# Patient Record
Sex: FEMALE | Race: WHITE | NOT HISPANIC OR LATINO | Employment: FULL TIME | URBAN - METROPOLITAN AREA
[De-identification: names, ages, dates, MRNs, and addresses within clinical notes are randomized per-mention and may not be internally consistent; named-entity substitution may affect disease eponyms.]

---
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
## 2022-02-11 VITALS
WEIGHT: 111.99 LBS | SYSTOLIC BLOOD PRESSURE: 100 MMHG | HEART RATE: 73 BPM | HEIGHT: 64 IN | OXYGEN SATURATION: 97 % | DIASTOLIC BLOOD PRESSURE: 68 MMHG | TEMPERATURE: 98.3 F | BODY MASS INDEX: 19.12 KG/M2 | WEIGHT: 112.43 LBS | HEIGHT: 64 IN | BODY MASS INDEX: 19.2 KG/M2 | HEART RATE: 100 BPM | OXYGEN SATURATION: 99 % | DIASTOLIC BLOOD PRESSURE: 60 MMHG | SYSTOLIC BLOOD PRESSURE: 104 MMHG | TEMPERATURE: 97.8 F

## 2022-02-11 VITALS
HEIGHT: 64 IN | TEMPERATURE: 98.1 F | BODY MASS INDEX: 18.4 KG/M2 | HEART RATE: 70 BPM | WEIGHT: 107.81 LBS | DIASTOLIC BLOOD PRESSURE: 66 MMHG | OXYGEN SATURATION: 98 % | RESPIRATION RATE: 20 BRPM | SYSTOLIC BLOOD PRESSURE: 102 MMHG

## 2022-02-12 VITALS
SYSTOLIC BLOOD PRESSURE: 112 MMHG | HEIGHT: 64 IN | BODY MASS INDEX: 19.68 KG/M2 | DIASTOLIC BLOOD PRESSURE: 70 MMHG | HEART RATE: 64 BPM | WEIGHT: 115.3 LBS | TEMPERATURE: 98.7 F

## 2022-02-12 VITALS
HEART RATE: 87 BPM | OXYGEN SATURATION: 97 % | BODY MASS INDEX: 19.8 KG/M2 | SYSTOLIC BLOOD PRESSURE: 102 MMHG | WEIGHT: 115.96 LBS | DIASTOLIC BLOOD PRESSURE: 62 MMHG | HEIGHT: 64 IN | TEMPERATURE: 97.7 F

## 2022-02-12 VITALS
WEIGHT: 114.64 LBS | OXYGEN SATURATION: 97 % | DIASTOLIC BLOOD PRESSURE: 60 MMHG | SYSTOLIC BLOOD PRESSURE: 92 MMHG | HEART RATE: 67 BPM | HEIGHT: 64 IN | BODY MASS INDEX: 19.57 KG/M2 | TEMPERATURE: 97.3 F

## 2022-02-12 VITALS
BODY MASS INDEX: 19.04 KG/M2 | WEIGHT: 111.55 LBS | DIASTOLIC BLOOD PRESSURE: 60 MMHG | SYSTOLIC BLOOD PRESSURE: 118 MMHG | TEMPERATURE: 97.9 F | OXYGEN SATURATION: 99 % | HEART RATE: 84 BPM | HEIGHT: 64 IN

## 2022-02-12 VITALS
DIASTOLIC BLOOD PRESSURE: 60 MMHG | HEART RATE: 99 BPM | BODY MASS INDEX: 19.68 KG/M2 | WEIGHT: 115.3 LBS | OXYGEN SATURATION: 99 % | SYSTOLIC BLOOD PRESSURE: 112 MMHG | HEIGHT: 64 IN

## 2022-02-12 VITALS
BODY MASS INDEX: 18.93 KG/M2 | WEIGHT: 110.89 LBS | DIASTOLIC BLOOD PRESSURE: 60 MMHG | HEART RATE: 62 BPM | TEMPERATURE: 97.2 F | HEIGHT: 64 IN | SYSTOLIC BLOOD PRESSURE: 100 MMHG

## 2022-02-12 VITALS
BODY MASS INDEX: 20.1 KG/M2 | HEART RATE: 91 BPM | HEIGHT: 64 IN | OXYGEN SATURATION: 98 % | WEIGHT: 117.73 LBS | SYSTOLIC BLOOD PRESSURE: 118 MMHG | DIASTOLIC BLOOD PRESSURE: 60 MMHG

## 2022-02-15 NOTE — PROGRESS NOTES
Patient:   RICARDO VALADEZ            MRN: [id number]            FIN: [id number]               Age:   19 years     Sex:  Female     :  2000   Associated Diagnoses:   Anxiety   Author:   Lisa Jaffe MD      Chief Complaint   3/22/2019 11:44 AM CDT   Patient presents for anxiety consult.      History of Present Illness   Chief complaint and symptoms as noted above and confirmed with patient.      Here today with mom and dad for anxiety.    Parents note current concerns are she is hyper focused on the fact that she is attending school in [place] even though they live in [place] and feels like she is cheating the system.  Has started resisting going to school and is not gone 2-3 days out of this week.  Also family has noticed a short temper at home.  Is quick to anger.  Parents describe going from 0-60 over everything.  Mom notes feels she is walking on anxious.  Family has started with a new therapist named Olga Prince in [facility].  Phone number [phone].  Family notes my it does not have much insight into the degree of her response when she gets angry about something.  Family did restart the CBD oil but has not noticed that it is helping.  Birth control has significantly helped her acne especially on her back.  Mom wonders about gene site.  Dad inquires about medical marijuana.  Has had passive thoughts of suicide but no concrete plan.         Review of Systems   All other systems are negative      Health Status   Allergies:    Allergic Reactions (Selected)  Severity Not Documented  Methylphenidate 24 hour extended release (Tachycardia and heart palpitations)   Medications:  (Selected)   Prescriptions  Prescribed  Ortho Tri-Cyclen oral tablet: 1 tab(s), PO, Daily, # 28 tab(s), 11 Refill(s), Type: Maintenance, Pharmacy: [facility] [id number] [facility], 1 tab(s) Oral daily  Documented Medications  Documented  CBD oil: CBD oil, Supply, 0 Refill(s), Type: Maintenance  Fish Oil: Oral, 0 Refill(s), Type:  Maintenance  Vitamin C 1000 mg oral tablet: = 1 tab(s) ( 1,000 mg ), Oral, daily, 0 Refill(s), Type: Maintenance  Vitamin D3 1000 intl units oral capsule: = 1 cap(s) ( 1,000 International Unit ), Oral, daily, 0 Refill(s), Type: Maintenance  multivitamin with minerals (w/ Iron): See Instructions, 0 Refill(s), Type: Maintenance   Problem list:    All Problems  ADHD / 56179147 / Confirmed      Histories   Past Medical History:    Active  ADHD (64803371)   Family History:    Attention deficit disorder  Father (Manolo)  Anxiety  Grandmother (P)  Hypertension  Father (Manolo)  Grandfather (P)  Migraine  Great Aunt (M)  Heart attack  Grandfather (M)  Hyperlipidemia  Father (Manolo)  Sleep apnea  Father (Manolo)  Skin cancer  Grandmother (M)  Grandmother (P)     Procedure history:    Tear duct surgery in the month of 7/2003 at 20 Months.   Social History:        Alcohol Assessment            Never, Use of alcohol by peers: No.      Tobacco Assessment            Never, Use of tobacco by peers: No.      Substance Abuse Assessment            Never, Use of drugs by peers: No.      Home and Environment Assessment            Lives with Father, Mother, 1 older sister.  Living situation: Home/Independent.  Risks in environment:               Firearm in the household - unknown if locked..      Physical Examination   Vital Signs   3/22/2019 11:44 AM CDT Temperature Tympanic 97.8 DegF  LOW    Peripheral Pulse Rate 73 bpm    HR Method Electronic    Systolic Blood Pressure 100 mmHg    Diastolic Blood Pressure 60 mmHg    Mean Arterial Pressure 73 mmHg    BP Site Right arm    BP Method Manual    Oxygen Saturation 97 %      Measurements from flowsheet : Measurements   3/22/2019 11:44 AM CDT Height Measured - Metric 162.56 cm    Weight Measured - Metric 51 kg    BSA - Metric 1.52 m2    Body Mass Index - Metric 19.3 kg/m2    Body Mass Index Percentile 26.79      Vital signs as noted above   General:  Alert and oriented, No acute distress.     Psychiatric:  Cooperative, Appropriate mood & affect, Non-suicidal, Tearful when her mom is tearful discussing the challenges. .       Review / Management   Jonas 7: 13, somewhat difficult.  PHQ A: 4/27.  Question 1 is to, question 2 is blank, question 9 is 1.      Impression and Plan   Diagnosis     Anxiety (MNE16-JL F41.9).     Plan:  After discussion with family will start Zoloft 25 mg once daily.  Risks including black box warning reviewed.  Recommended regarding the house including taking out any firearms, locking all medications including over-the-counter, and is not leaving my unattended for any extended period of time especially in the first 2 weeks of medication initiation and if any med dosing changes occur.  Continue with therapy.  Next visit is next Wednesday.  Reviewed suicidal ideation is a medical emergency and they should go to ER if this occurs.  Return to clinic for recheck in 4-6 weeks.  Mom will call in 2 weeks with an update and may consider titration at that time.  Mom will let me know where they can have GeneSite testing and we will get it ordered. .    Orders     Orders (Selected)   Prescriptions  Prescribed  Zoloft 25 mg oral tablet: = 1 tab(s) ( 25 mg ), PO, Daily, # 30 tab(s), 1 Refill(s), Type: Maintenance, Pharmacy: [facility] [id number] [facility], 1 tab(s) Oral daily.

## 2022-02-15 NOTE — LETTER
(Inserted Image. Unable to display)       September 17, 2019      RICARDO VALADEZ   [address], [place] [id number]          Dear RICARDO,      Thank you for selecting [facility] for your healthcare needs.     Our records indicate you are due for the following services:     Follow-up Office Visit    To schedule an appointment or if you have further questions, please contact your primary clinic:   [facility]       [phone]   [facility]       [phone]              [facility]     [phone]    Powered by [employer]    Sincerely,    Lisa Jaffe MD

## 2022-02-15 NOTE — TELEPHONE ENCOUNTER
Entered by Vane Delong CMA on March 23, 2021 11:17:00 AM CDT  LOV: 09/04/20 Med Check  Reminder letter Mailed: 03/04/21  LRF: 02/24/21#30    Per protocol, sent in #14 tabs and sent message to appointment pool to set up appointment.  Vane Delong CMA      ------------------------------------------  From: [facility] [address] [facility]  To: Lisa Jaffe MD  Sent: March 21, 2021 7:32:06 AM CDT  Subject: Medication Management  Due: March 18, 2021 11:58:04 AM CDT     ** On Hold Pending Signature **     Dispensed Drug: sertraline (sertraline 25 mg oral tablet), 1 TAB(S) ORAL DAILY,INSTR:TAKE W/ 50MG TABS TO GET 75MG TOTAL.  Quantity: 30 tab(s)  Days Supply: 30  Refills: 0  Substitutions Allowed  Notes from Pharmacy:     ** On Hold Pending Signature **     Dispensed Drug: sertraline (sertraline 50 mg oral tablet), TAKE 1 TABLET BY MOUTH DAILY IN ADDITION TO 25MG TAB FOR 75MG DAILY DOSE. SEE DR FOR FUTURE REFILLS.  Quantity: 30 tab(s)  Days Supply: 30  Refills: 0  Substitutions Allowed  Notes from Pharmacy:  ------------------------------------------  ** Submitted: **  Order:sertraline (sertraline 25 mg oral tablet)  1 tab(s)  Oral  daily  INSTR:TAKE W/ 50MG TABS TO GET 75MG TOTAL.  Qty:  14 tab(s)        Duration:  14 day(s)        Refills:  0          Substitutions Allowed     Route To Pharmacy - [place] [id number] [facility]    Signed by Vane Delong CMA  3/23/2021 4:17:00 PM [facility]    ** Submitted: **  Complete:sertraline (sertraline 50 mg oral tablet)   Signed by Vane Delong CMA  3/23/2021 4:18:00 PM [facility]    ** Not Approved:  **  sertraline (SERTRALINE HCL 25 MG TABLET)  1 TAB(S) ORAL DAILY,INSTR:TAKE W/ 50MG TABS TO GET 75MG TOTAL.  Qty:  30 tab(s)        Days Supply:  30        Refills:  0          Substitutions Allowed     Route To Pharmacy - [facility] [address] [facility]   Signed by Vane Delong CMA---------------------  From: Vane Delong CMA   To: [facility] [address] [facility]    Sent: 3/23/2021 11:19:07 AM CDT  Subject: Medication Management      ** Submitted: **  Order:sertraline (sertraline 50 mg oral tablet)  1 tab(s)  Oral  daily  DOSE. SEE DR FOR FUTURE REFILLS.  Qty:  14 tab(s)        Duration:  14 day(s)        Refills:  0          Substitutions Allowed     Route To Pharmacy - [facility] [address] [facility]    Signed by Vane Delong CMA  3/23/2021 4:18:00 PM [facility]    ** Submitted: **  Complete:sertraline (sertraline 25 mg oral tablet)   Signed by Vane Delong CMA  3/23/2021 4:19:00 PM [facility]    ** Not Approved:  **  sertraline (SERTRALINE HCL 50 MG TABLET)  TAKE 1 TABLET BY MOUTH DAILY IN ADDITION TO 25MG TAB FOR 75MG DAILY DOSE. SEE DR FOR FUTURE REFILLS.  Qty:  30 tab(s)        Days Supply:  30        Refills:  0          Substitutions Allowed     Route To Pharmacy - [facility] [address] [facility]   Signed by Vane Delong CMA

## 2022-02-15 NOTE — NURSING NOTE
Seven day Holter Monitor Cardio key placed on patient per ARM for a diagnosis of tachycardia and  anxiety. Patient instructions given for all aspects of monitor operation and handling. Monitor kit sent with patient with contact information for [employer]. Patient leaves clinic ambulatory with mother. More than 15 minutes spent with patient for education and instruction.

## 2022-02-15 NOTE — NURSING NOTE
Comprehensive Intake Entered On:  9/1/2020 3:39 PM CDT    Performed On:  9/1/2020 3:38 PM CDT by Callum Begum               Summary   Chief Complaint :   Med check    Menstrual Status :   Menarcheal   Weight Measured - Metric :   50.6 kg(Converted to: 111 lb 9 oz, 111.554 lb)    Height Measured - Metric :   161.9 cm(Converted to: 5 ft 4 in, 5.31 ft, 1.62 m)    Body Mass Index - Metric :   19.3 kg/m2   BSA - Metric :   1.51 m2   Systolic Blood Pressure :   118 mmHg   Diastolic Blood Pressure :   60 mmHg   Mean Arterial Pressure :   79 mmHg   Peripheral Pulse Rate :   84 bpm   BP Site :   Right arm   BP Method :   Manual   HR Method :   Electronic   Temperature Tympanic :   97.9 DegF(Converted to: 36.6 DegC)    Oxygen Saturation :   99 %   Callum Begum - 9/1/2020 3:38 PM CDT   Health Status   Allergies Verified? :   Yes   Medication History Verified? :   Yes   Immunizations Current :   Yes   Medical History Verified? :   Yes   Pre-Visit Planning Status :   Completed   Well Child Visit? :   Yes   Callum Begum - 9/1/2020 3:38 PM CDT   Consents   Consent for Immunization Exchange :   Consent Granted   Consent for Immunizations to Providers :   Consent Granted   Callum Begum - 9/1/2020 3:38 PM CDT   Meds / Allergies   (As Of: 9/1/2020 3:39:24 PM CDT)   Allergies (Active)   No Known Medication Allergies  Estimated Onset Date:   Unspecified ; Created By:   Agnieszka Amaral CMA; Reaction Status:   Active ; Category:   Drug ; Substance:   No Known Medication Allergies ; Type:   Allergy ; Updated By:   Agnieszka Amaral CMA; Reviewed Date:   9/1/2020 3:38 PM CDT        Medication List   (As Of: 9/1/2020 3:39:24 PM CDT)   Prescription/Discharge Order    sertraline  :   sertraline ; Status:   Prescribed ; Ordered As Mnemonic:   sertraline 50 mg oral tablet ; Simple Display Line:   1 tab(s), Oral, daily, 30 tab(s), 0 Refill(s) ; Ordering Provider:   Lisa Jaffe MD; Catalog  Code:   sertraline ; Order Dt/Tm:   8/10/2020 9:19:16 AM CDT          ethinyl estradiol-norgestimate  :   ethinyl estradiol-norgestimate ; Status:   Prescribed ; Ordered As Mnemonic:   Ortho Tri-Cyclen oral tablet ; Simple Display Line:   1 tab(s), PO, Daily, 28 tab(s), 11 Refill(s) ; Ordering Provider:   Lisa Jaffe MD; Catalog Code:   ethinyl estradiol-norgestimate ; Order Dt/Tm:   1/31/2020 9:53:54 AM [facility]            Home Meds    ascorbic acid  :   ascorbic acid ; Status:   Documented ; Ordered As Mnemonic:   Vitamin C 1000 mg oral tablet ; Simple Display Line:   1,000 mg, 1 tab(s), Oral, daily, 0 Refill(s) ; Catalog Code:   ascorbic acid ; Order Dt/Tm:   3/22/2019 11:47:20 AM CDT          cholecalciferol  :   cholecalciferol ; Status:   Documented ; Ordered As Mnemonic:   Vitamin D3 1000 intl units oral capsule ; Simple Display Line:   1,000 International Unit, 1 cap(s), Oral, daily, 0 Refill(s) ; Catalog Code:   cholecalciferol ; Order Dt/Tm:   3/22/2019 11:46:51 AM CDT          multivitamin with minerals  :   multivitamin with minerals ; Status:   Documented ; Ordered As Mnemonic:   multivitamin with minerals (w/ Iron) ; Simple Display Line:   See Instructions, 0 Refill(s) ; Catalog Code:   multivitamin with minerals ; Order Dt/Tm:   3/22/2019 11:47:37 AM CDT          omega-3 polyunsaturated fatty acids  :   omega-3 polyunsaturated fatty acids ; Status:   Documented ; Ordered As Mnemonic:   Fish Oil ; Simple Display Line:   Oral, 0 Refill(s) ; Catalog Code:   omega-3 polyunsaturated fatty acids ; Order Dt/Tm:   3/22/2019 11:47:00 AM CDT            ID Risk Screen   Recent Travel History :   No recent travel   Family Member Travel History :   No recent travel   Other Exposure to Infectious Disease :   Unknown   Callum Begum - 9/1/2020 3:38 PM CDT

## 2022-02-15 NOTE — NURSING NOTE
Depression Screening Entered On:  2/4/2020 10:13 AM CST    Performed On:  1/31/2020 10:13 AM CST by Lavinia Reynoso               Depression Screening   Little Interest - Pleasure in Activities :   Not at all   Feeling Down, Depressed, Hopeless :   Not at all   Initial Depression Screen Score :   0    Trouble Falling or Staying Asleep :   Not at all   Feeling Tired or Little Energy :   Not at all   Poor Appetite or Overeating :   Not at all   Feeling Bad About Yourself :   Several days   Trouble Concentrating :   Several days   Moving or Speaking Slowly :   Not at all   Thoughts Better Off Dead or Hurting Self :   Not at all   Detailed Depression Screen Score :   2    Total Depression Screen Score :   2    Lavinia Reynoso - 2/4/2020 10:13 AM CST

## 2022-02-15 NOTE — TELEPHONE ENCOUNTER
---------------------  From: Linda Mccullough CMA   Sent: 5/8/2019 8:43:29 AM CDT  Subject: General Message-holter question     Phone Message    PCP:         Time of Call:  823       Person Calling:  mom  Phone number:  [phone]    Returned call at: 0830    Note:   Holter placed yesterday and forgot to ask if it ok to fly/go thru TSA with monitor on.    Discussed with Magda and she advised contacting the [employer] # given.  Shared with Lorie and she expressed understanding

## 2022-02-15 NOTE — TELEPHONE ENCOUNTER
---------------------  From: Aurora Kimble CMA   To: ARM Message Pool ([id number]_[place]);     Sent: 4/11/2019 2:09:50 PM CDT  Subject: General Message-Zoloft med     Phone Message:      PCP: ARM    Person Calling: Marcy  Phone: [phone]  Time: 11:45am    Reason for call: Mom called stating that pt was started on Zoloft 25mg QD at her anxiety appt on 3/22/19 and then mom called in on 4/5 and Dr. Jaffe increased Zoloft to 50mg QD but pt has only 2 tabs left and pharmacy will not let them fill Rx since the 2 tabs QD was never called in. Also pt's heart is racing again and mom wondering what testing pt needs to be set up for-does she see ARM first or need specialist. Please advise.       Note: I did send one month worth of medication to [facility] for Zoloft 25mg 2 tabs QD per ARM's note last week. Due for f/u in 2 weeks.    Transferred to:---------------------  From: Agnieszka Amaral CMA (ARM Message Pool ([id number]_[facility]))   To: Lisa Jaffe MD;     Sent: 4/11/2019 2:20:24 PM CDT  Subject: FW: General Message-Zoloft med---------------------  From: Lisa Jaffe MD   To: Phone [employer] ([id number]_[place] [facility] [place]);     Sent: 4/11/2019 4:15:18 PM CDT  Subject: RE: General Message-Zoloft med     We should have her set up for an event/Holter monitor- I can order and they can call for a nurse only appointment.  I would like to see her in clinic when this is completed- hopefully this could be done before their 4-6 week follow up.  In the meantime, mom can check heart rate when it occurs- if greater than 200 should be seen in ED.  I will send the 50mg Zoloft.  Thanks.called mother of pt at 1455 to inform of this. She will call and schedule nurse only appointment once they look at schedules

## 2022-02-15 NOTE — PROGRESS NOTES
Patient:   RICARDO VALADEZ            MRN: [id number]            FIN: [id number]               Age:   20 years     Sex:  Female     :  2000   Associated Diagnoses:   Anxiety; Immunization due; Eye irritation; Surveillance of contraceptive pill   Author:   Lisa Jaffe MD      Visit Information      Date of Service: 2020 09:05 am  Performing Location: [facility]  Encounter#: [id number]      Primary Care Provider (PCP):  Lisa Jaffe MD    NPI# [id number]      Referring Provider:  Lisa Jaffe MD    NPI# [id number]      Chief Complaint   2020 9:20 AM CST    med check. things going well. okay to be seen alone.      Interval History   Chief Complaint noted and reviewed with patient.     School/grades: Graduated high school. Taking a  coarse through [employer]. No thoughts of self harm. Will be traveling for a canoe trip in the summer. Thinks her Zoloft medication is going great.     Eyes- Patient states they are always irritated. Does use eye makeup. Slightly itchy. Has used topical in the past which helped.     Sleep:  Gets 7-8 hours of sleep each night.     Needs refills on oral contraceptives.  Acne has been much better on this.  Still flares right before her menses but overall family is very happy with how it is going.       Review of Systems   Constitutional:  Negative.    Eye:  Negative.    Ear/Nose/Mouth/Throat:  Negative.    Respiratory:  Negative.    Cardiovascular:  Negative.    Gastrointestinal:  Negative.    Genitourinary:  Negative.    Musculoskeletal:  Negative.    Integumentary:  Negative.       Health Status   Allergies:    Allergic Reactions (Selected)  No Known Medication Allergies   Medications:  (Selected)   Prescriptions  Prescribed  Ortho Tri-Cyclen oral tablet: 1 tab(s), PO, Daily, # 84 tab(s), 4 Refill(s), Type: Maintenance, Pharmacy: [facility] [address] [facility], 1 tab(s) Oral daily  Zoloft 50 mg oral tablet: = 1 tab(s) ( 50 mg ), PO, Daily, # 90 tab(s), 1 Refill(s),  Type: Maintenance, Pharmacy: [facility] [address] [facility], 1 tab(s) Oral daily  Documented Medications  Documented  Fish Oil: Oral, 0 Refill(s), Type: Maintenance  Vitamin C 1000 mg oral tablet: = 1 tab(s) ( 1,000 mg ), Oral, daily, 0 Refill(s), Type: Maintenance  Vitamin D3 1000 intl units oral capsule: = 1 cap(s) ( 1,000 International Unit ), Oral, daily, 0 Refill(s), Type: Maintenance  multivitamin with minerals (w/ Iron): See Instructions, 0 Refill(s), Type: Maintenance,    Medications          *denotes recorded medication          *Vitamin C 1000 mg oral tablet: 1,000 mg, 1 tab(s), Oral, daily, 0 Refill(s).          *Vitamin D3 1000 intl units oral capsule: 1,000 International Unit, 1 cap(s), Oral, daily, 0 Refill(s).          Ortho Tri-Cyclen oral tablet: 1 tab(s), PO, Daily, 84 tab(s), 4 Refill(s).          *multivitamin with minerals (w/ Iron): See Instructions, 0 Refill(s).          *Fish Oil: Oral, 0 Refill(s).          Zoloft 50 mg oral tablet: 50 mg, 1 tab(s), PO, Daily, 90 tab(s), 1 Refill(s).       Problem list:    All Problems  ADHD / SNOMED CT 17757390 / Confirmed      Histories   Past Medical History:    Active  ADHD (81418990)   Family History:    Attention deficit disorder  Father (Manolo)  Anxiety  Grandmother (P)  Hypertension  Father (Manolo)  Grandfather (P)  Migraine  Great Aunt (M)  Heart attack  Grandfather (M)  Hyperlipidemia  Father (Manolo)  Sleep apnea  Father (Manolo)  Skin cancer  Grandmother (M)  Grandmother (P)     Procedure history:    Tear duct surgery in the month of 7/2003 at 18 Months.   Social History:        Alcohol Assessment            Never, Use of alcohol by peers: No.      Tobacco Assessment            Never, Use of tobacco by peers: No.      Substance Abuse Assessment            Never, Use of drugs by peers: No.      Home and Environment Assessment            Lives with Father, Mother, 1 older sister.  Living situation: Home/Independent.  Risks in environment:               Firearm in  the household - unknown if locked..        Physical Examination   Vital Signs   1/31/2020 9:20 AM CST Peripheral Pulse Rate 99 bpm    Systolic Blood Pressure 112 mmHg    Diastolic Blood Pressure 60 mmHg    Mean Arterial Pressure 77 mmHg    Oxygen Saturation 99 %      Measurements from flowsheet : Measurements   1/31/2020 9:20 AM CST Height Measured - Metric 162.5 cm    Weight Measured - Metric 52.3 kg    BSA - Metric 1.54 m2    Body Mass Index - Metric 19.81 kg/m2    Body Mass Index Percentile 29.92      General:  :, bright affect.    Eye:  Pupils are equal, round and reactive to light, Extraocular movements are intact, Normal conjunctiva.    HENT:  Normocephalic, Tympanic membranes are clear, Oral mucosa is moist, No pharyngeal erythema, Upper eyelids with erythematous dry patch bilaterally.    Neck:  No lymphadenopathy.    Respiratory:  Lungs are clear to auscultation, Respirations are non-labored.    Cardiovascular:  Normal rate, Regular rhythm, No murmur.    Neurologic:  Alert, Oriented, Normal motor function, No focal deficits.       Review / Management   Results review   STAR 7: 2 total  PHQ-A: 2/27      Impression and Plan   Diagnosis     Anxiety (FUJ55-VX F41.9).     Eye irritation (RQA53-EU H57.89).     Surveillance of contraceptive pill (DRZ03-YM Z30.41).     Immunization due (UMR09-DG Z23).     Plan:  Menactra vaccine updated today.   Continue current Zoloft.   Topical antibiotic ointment to the eyelid issue.  Consider changing her makeup.  Can also do a small amount of hydrocortisone if needed- avoid getting into the eye.   Continue current oral contraceptives.   RTC for recheck 6 months, sooner if needed..    Orders     Orders (Selected)   Outpatient Orders  Completed  Menactra: 0.5 mL, IM, once  Prescriptions  Prescribed  Ortho Tri-Cyclen oral tablet: 1 tab(s), PO, Daily, # 28 tab(s), 11 Refill(s), Type: Maintenance, Pharmacy: [place] [address] [facility], 1 tab(s) Oral daily  Zoloft 50 mg oral tablet: = 1  tab(s) ( 50 mg ), PO, Daily, # 90 tab(s), 1 Refill(s), Type: Maintenance, Pharmacy: [facility] [address] [facility], 1 tab(s) Oral daily  erythromycin 0.5% ophthalmic ointment: See Instructions, Instructions: 1 application twice daily to affected area on eyelid x 5 days, # 3.5 gm, 0 Refill(s), Type: Acute, Pharmacy: [place] [address] [facility], 1 application twice daily to affected area on eyelid x 5 days.        Professional Services   I, Kellie Sam LPN, acted solely as a scribe for, and in the presence of Dr. Lisa Jaffe who performed the services.

## 2022-02-15 NOTE — RESULTS
(Inserted Image. Unable to display)          (Inserted Image. Unable to display)     [address], [place] [address]  Phone [phone]  Fax [phone]  HOLTER MONITOR REPORT    RICARDO VALADEZOscar : 2000  Date of Exam:  2019  MRN: [id number]   Ordering HCP:  Lisa Jaffe MD       INDICATION:  Paroxysmal tachycardia.     FINDINGS: The Holter monitor recording period was 7 days with analysis time of 6 days 18 hours and 38 minutes.     The rhythm is sinus.  Average rate of 74.  Minimum rate of 46.  Maximum rate of 138.  No pauses or heart block.    No SVT or atrial fibrillation.                       No ventricular ectopics.        No patient symptom events.         IMPRESSION:  Normal 7 day recording; however, no patient symptom events during the recording.         Abelardo Muniz MD, FACP  Interpreting HCP                    DOREEN/kyler   D:  2019                                                                          T:  2019    HOLTER MONITOR REPORT

## 2022-02-15 NOTE — NURSING NOTE
Comprehensive Intake Entered On:  5/16/2019 8:29 AM CDT    Performed On:  5/16/2019 8:26 AM CDT by Agnieszka Amaral CMA               Summary   Chief Complaint :   Patient presents for anxiety medication check.   Menstrual Status :   Menarcheal   Weight Measured - Metric :   50.8 kg(Converted to: 112 lb 0 oz, 111.995 lb)    Height Measured - Metric :   162.56 cm(Converted to: 5 ft 4 in, 5.33 ft, 1.63 m)    Body Mass Index - Metric :   19.22 kg/m2   BSA - Metric :   1.51 m2   Systolic Blood Pressure :   104 mmHg   Diastolic Blood Pressure :   68 mmHg   Mean Arterial Pressure :   80 mmHg   Peripheral Pulse Rate :   100 bpm (HI)    BP Site :   Right arm   BP Method :   Manual   HR Method :   Electronic   Temperature Tympanic :   98.3 DegF(Converted to: 36.8 DegC)    Oxygen Saturation :   99 %   Agnieszka Amaral CMA - 5/16/2019 8:26 AM CDT   Health Status   Allergies Verified? :   Yes   Medication History Verified? :   Yes   Immunizations Current :   Yes   Pre-Visit Planning Status :   Completed   Tobacco Use? :   Never smoker   Agnieszka Amaral CMA - 5/16/2019 8:26 AM CDT   Consents   Consent for Immunization Exchange :   Consent Granted   Consent for Immunizations to Providers :   Consent Granted   Agnieszka Amaral CMA - 5/16/2019 8:26 AM CDT   Problems   (As Of: 5/16/2019 8:29:58 AM CDT)   Problems(Active)    ADHD (SNOMED CT  :77420596 )  Name of Problem:   ADHD ; Recorder:   Lisa Jaffe MD; Confirmation:   Confirmed ; Classification:   Medical ; Code:   14562824 ; Contributor System:   [facility] [place] ; Last Updated:   3/24/2016 9:08 AM CDT ; Life Cycle Status:   Active ; Responsible Provider:   Lisa Jaffe MD; Vocabulary:   SNOMED CT          Meds / Allergies   (As Of: 5/16/2019 8:29:58 AM CDT)   Allergies (Active)   No Known Medication Allergies  Estimated Onset Date:   Unspecified ; Created By:   David Murray CMA; Reaction Status:   Active ; Category:   Drug ; Substance:   No Known Medication Allergies ;  Type:   Allergy ; Updated By:   David Murray CMA; Reviewed Date:   5/16/2019 8:27 AM CDT        Medication List   (As Of: 5/16/2019 8:29:58 AM CDT)   Prescription/Discharge Order    ethinyl estradiol-norgestimate  :   ethinyl estradiol-norgestimate ; Status:   Prescribed ; Ordered As Mnemonic:   Ortho Tri-Cyclen oral tablet ; Simple Display Line:   1 tab(s), PO, Daily, 28 tab(s), 11 Refill(s) ; Ordering Provider:   Lsia Jaffe MD; Catalog Code:   ethinyl estradiol-norgestimate ; Order Dt/Tm:   1/28/2019 3:17:23 PM          sertraline  :   sertraline ; Status:   Prescribed ; Ordered As Mnemonic:   Zoloft 50 mg oral tablet ; Simple Display Line:   50 mg, 1 tab(s), PO, Daily, 7 tab(s), 0 Refill(s) ; Ordering Provider:   Lisa Jaffe MD; Catalog Code:   sertraline ; Order Dt/Tm:   5/7/2019 11:22:10 AM            Home Meds    ascorbic acid  :   ascorbic acid ; Status:   Documented ; Ordered As Mnemonic:   Vitamin C 1000 mg oral tablet ; Simple Display Line:   1,000 mg, 1 tab(s), Oral, daily, 0 Refill(s) ; Catalog Code:   ascorbic acid ; Order Dt/Tm:   3/22/2019 11:47:20 AM          cholecalciferol  :   cholecalciferol ; Status:   Documented ; Ordered As Mnemonic:   Vitamin D3 1000 intl units oral capsule ; Simple Display Line:   1,000 International Unit, 1 cap(s), Oral, daily, 0 Refill(s) ; Catalog Code:   cholecalciferol ; Order Dt/Tm:   3/22/2019 11:46:51 AM          Miscellaneous Prescription  :   Miscellaneous Prescription ; Status:   Processing ; Ordered As Mnemonic:   CBD oil ; Action Display:   Complete ; Catalog Code:   Miscellaneous Prescription ; Order Dt/Tm:   5/16/2019 8:28:11 AM          multivitamin with minerals  :   multivitamin with minerals ; Status:   Documented ; Ordered As Mnemonic:   multivitamin with minerals (w/ Iron) ; Simple Display Line:   See Instructions, 0 Refill(s) ; Catalog Code:   multivitamin with minerals ; Order Dt/Tm:   3/22/2019 11:47:37 AM          omega-3 polyunsaturated  fatty acids  :   omega-3 polyunsaturated fatty acids ; Status:   Documented ; Ordered As Mnemonic:   Fish Oil ; Simple Display Line:   Oral, 0 Refill(s) ; Catalog Code:   omega-3 polyunsaturated fatty acids ; Order Dt/Tm:   3/22/2019 11:47:00 AM

## 2022-02-15 NOTE — NURSING NOTE
Comprehensive Intake Entered On:  10/17/2019 2:04 PM CDT    Performed On:  10/17/2019 1:59 PM CDT by Agnieszka Amaral CMA               Summary   Chief Complaint :   Patient presents for anxiety follow-up.   Menstrual Status :   Menarcheal   Weight Measured - Metric :   52 kg(Converted to: 114 lb 10 oz, 114.640 lb)    Height Measured - Metric :   162.56 cm(Converted to: 5 ft 4 in, 5.33 ft, 1.63 m)    Body Mass Index - Metric :   19.68 kg/m2   BSA - Metric :   1.53 m2   Systolic Blood Pressure :   92 mmHg   Diastolic Blood Pressure :   60 mmHg   Mean Arterial Pressure :   71 mmHg   Peripheral Pulse Rate :   67 bpm   BP Site :   Right arm   BP Method :   Manual   HR Method :   Electronic   Temperature Tympanic :   97.3 DegF(Converted to: 36.3 DegC)  (LOW)    Oxygen Saturation :   97 %   Agnieszka Amaral CMA - 10/17/2019 1:59 PM CDT   Health Status   Allergies Verified? :   Yes   Medication History Verified? :   Yes   Immunizations Current :   Yes   Pre-Visit Planning Status :   Completed   Tobacco Use? :   Never smoker   Agnieszka Amaral CMA - 10/17/2019 1:59 PM CDT   Consents   Consent for Immunization Exchange :   Consent Granted   Consent for Immunizations to Providers :   Consent Granted   Agnieszka Amaral CMA - 10/17/2019 1:59 PM CDT   Meds / Allergies   (As Of: 10/17/2019 2:04:37 PM CDT)   Allergies (Active)   No Known Medication Allergies  Estimated Onset Date:   Unspecified ; Created By:   Agnieszka Amaral CMA; Reaction Status:   Active ; Category:   Drug ; Substance:   No Known Medication Allergies ; Type:   Allergy ; Updated By:   Agnieszka Amaral CMA; Reviewed Date:   10/17/2019 2:00 PM CDT        Medication List   (As Of: 10/17/2019 2:04:37 PM CDT)   Prescription/Discharge Order    ethinyl estradiol-norgestimate  :   ethinyl estradiol-norgestimate ; Status:   Prescribed ; Ordered As Mnemonic:   Ortho Tri-Cyclen oral tablet ; Simple Display Line:   1 tab(s), PO, Daily, 84 tab(s), 4 Refill(s) ; Ordering  Provider:   Lisa Jaffe MD; Catalog Code:   ethinyl estradiol-norgestimate ; Order Dt/Tm:   5/16/2019 8:45:39 AM CDT          sertraline  :   sertraline ; Status:   Prescribed ; Ordered As Mnemonic:   Zoloft 50 mg oral tablet ; Simple Display Line:   50 mg, 1 tab(s), PO, Daily, 90 tab(s), 1 Refill(s) ; Ordering Provider:   Lisa Jaffe MD; Catalog Code:   sertraline ; Order Dt/Tm:   5/16/2019 8:44:52 AM CDT            Home Meds    ascorbic acid  :   ascorbic acid ; Status:   Documented ; Ordered As Mnemonic:   Vitamin C 1000 mg oral tablet ; Simple Display Line:   1,000 mg, 1 tab(s), Oral, daily, 0 Refill(s) ; Catalog Code:   ascorbic acid ; Order Dt/Tm:   3/22/2019 11:47:20 AM CDT          cholecalciferol  :   cholecalciferol ; Status:   Documented ; Ordered As Mnemonic:   Vitamin D3 1000 intl units oral capsule ; Simple Display Line:   1,000 International Unit, 1 cap(s), Oral, daily, 0 Refill(s) ; Catalog Code:   cholecalciferol ; Order Dt/Tm:   3/22/2019 11:46:51 AM CDT          multivitamin with minerals  :   multivitamin with minerals ; Status:   Documented ; Ordered As Mnemonic:   multivitamin with minerals (w/ Iron) ; Simple Display Line:   See Instructions, 0 Refill(s) ; Catalog Code:   multivitamin with minerals ; Order Dt/Tm:   3/22/2019 11:47:37 AM CDT          omega-3 polyunsaturated fatty acids  :   omega-3 polyunsaturated fatty acids ; Status:   Documented ; Ordered As Mnemonic:   Fish Oil ; Simple Display Line:   Oral, 0 Refill(s) ; Catalog Code:   omega-3 polyunsaturated fatty acids ; Order Dt/Tm:   3/22/2019 11:47:00 AM CDT

## 2022-02-15 NOTE — PROGRESS NOTES
Patient:   RICARDO VALADEZ            MRN: [id number]            FIN: [id number]               Age:   19 years     Sex:  Female     :  2000   Associated Diagnoses:   Anxiety   Author:   Lisa Jaffe MD      Visit Information      Date of Service: 2019 08:14 am  Performing Location: [facility]  Encounter#: [id number]      Primary Care Provider (PCP):  Lisa Jaffe MD    NPI# [id number]      Referring Provider:  Lisa Jaffe MD    NPI# [id number]      Chief Complaint   2019 8:26 AM CDT    Patient presents for anxiety medication check.      History of Present Illness   Chief complaint and symptoms as noted above and confirmed with patient.  Here today with dad.     Feeling better on Zoloft - not as irritable. Dad thinks irritability/outbursts have improved by about half. Deleted snap chat- doing better without this vicente.     Sleep going well- feels hot sometimes at night which wakes her up.    Heart monitor went well dad said no episodes recorded.    BC going well- missed one pill ended up having a 12 day period. Needs 3mos supply of oral contraceptive.      Review of Systems   All other systems are negative      Health Status   Allergies:    Allergic Reactions (Selected)  No Known Medication Allergies   Medications:  (Selected)   Prescriptions  Prescribed  Ortho Tri-Cyclen oral tablet: 1 tab(s), PO, Daily, # 28 tab(s), 11 Refill(s), Type: Maintenance, Pharmacy: [facility], 1 tab(s) Oral daily  Zoloft 50 mg oral tablet: = 1 tab(s) ( 50 mg ), PO, Daily, # 7 tab(s), 0 Refill(s), Type: Maintenance, Pharmacy: [facility], 1 tab(s) Oral daily  Documented Medications  Documented  Fish Oil: Oral, 0 Refill(s), Type: Maintenance  Vitamin C 1000 mg oral tablet: = 1 tab(s) ( 1,000 mg ), Oral, daily, 0 Refill(s), Type: Maintenance  Vitamin D3 1000 intl units oral capsule: = 1 cap(s) ( 1,000 International Unit ), Oral, daily, 0 Refill(s), Type: Maintenance  multivitamin with minerals  (w/ Iron): See Instructions, 0 Refill(s), Type: Maintenance,    Medications          *denotes recorded medication          *Vitamin C 1000 mg oral tablet: 1,000 mg, 1 tab(s), Oral, daily, 0 Refill(s).          *Vitamin D3 1000 intl units oral capsule: 1,000 International Unit, 1 cap(s), Oral, daily, 0 Refill(s).          Ortho Tri-Cyclen oral tablet: 1 tab(s), PO, Daily, 28 tab(s), 11 Refill(s).          *multivitamin with minerals (w/ Iron): See Instructions, 0 Refill(s).          *Fish Oil: Oral, 0 Refill(s).          Zoloft 50 mg oral tablet: 50 mg, 1 tab(s), PO, Daily, 7 tab(s), 0 Refill(s).     Problem list:    All Problems  ADHD / SNOMED CT 32163787 / Confirmed      Histories   Past Medical History:    Active  ADHD (99323297)   Family History:    Attention deficit disorder  Father (Manolo)  Anxiety  Grandmother (P)  Hypertension  Father (Manolo)  Grandfather (P)  Migraine  Great Aunt (M)  Heart attack  Grandfather (M)  Hyperlipidemia  Father (Manolo)  Sleep apnea  Father (Manolo)  Skin cancer  Grandmother (M)  Grandmother (P)     Procedure history:    Tear duct surgery in the month of 7/2003 at 20 Months.   Social History:        Alcohol Assessment            Never, Use of alcohol by peers: No.      Tobacco Assessment            Never, Use of tobacco by peers: No.      Substance Abuse Assessment            Never, Use of drugs by peers: No.      Home and Environment Assessment            Lives with Father, Mother, 1 older sister.  Living situation: Home/Independent.  Risks in environment:               Firearm in the household - unknown if locked..      Physical Examination   Vital Signs   5/16/2019 8:26 AM CDT Temperature Tympanic 98.3 DegF    Peripheral Pulse Rate 100 bpm  HI    HR Method Electronic    Systolic Blood Pressure 104 mmHg    Diastolic Blood Pressure 68 mmHg    Mean Arterial Pressure 80 mmHg    BP Site Right arm    BP Method Manual    Oxygen Saturation 99 %      Measurements from flowsheet : Measurements    5/16/2019 8:26 AM CDT Height Measured - Metric 162.56 cm    Weight Measured - Metric 50.8 kg    BSA - Metric 1.51 m2    Body Mass Index - Metric 19.22 kg/m2    Body Mass Index Percentile 25.25      Vital signs as noted above   General:  Alert and oriented, Great eye contact, smiles.    Respiratory:  Lungs clear to auscultation bilaterally.  Equal air entry.  Symmetrical chest expansion.  No wheezing.  .    Cardiovascular:  S1 and S2 with regular rate and rhythm.  No murmurs.  Pulses 2+ in all four extremities.  Brisk capillary refill.  .    Psychiatric:  Cooperative, Appropriate mood & affect.       Review / Management   PHQ-A: 2/27 (previously 4/27)  STAR-7: total 3 (previously 13)      Impression and Plan   Diagnosis     Anxiety (AFH60-QA F41.9).     Plan:  Continue Zoloft 50mg.  Continue oral contraceptive sent 3 month supply.  RTC in 3-4mos f/u Anxiety sooner if needed. .    Orders     Orders (Selected)   Prescriptions  Prescribed  Ortho Tri-Cyclen oral tablet: 1 tab(s), PO, Daily, # 84 tab(s), 4 Refill(s), Type: Maintenance, Pharmacy: [id number], 1 tab(s) Oral daily  Zoloft 50 mg oral tablet: = 1 tab(s) ( 50 mg ), PO, Daily, # 90 tab(s), 1 Refill(s), Type: Maintenance, Pharmacy: [facility] (?????) [id number], 1 tab(s) Oral daily.        Professional Services   IAgnieszka CMA ([facility]) acted solely as a scribe for and in the presents of Dr Lisa Jaffe who performed the services.

## 2022-02-15 NOTE — TELEPHONE ENCOUNTER
---------------------  From: Bailey Magallon RN   To: INR Pool ( [id number]_[facility]);     Sent: 7/12/2019 1:35:28 PM CDT  Subject: 7 day holter     VM received from pt adrien Melo. Stating they would like to re-do the 7 day heart monitor as pt has had episodes again and first monitor did not record any info.    Pt wants to see the CardioKey and LifeWatch monitor at appointment time to decide which device to use.    Called pt mother and she was transferred to appt line to schedule placement.Scheduled for 7/17/19CardioKey placed per pt request

## 2022-02-15 NOTE — PROGRESS NOTES
Patient:   ALEXA VALADEZ            MRN: [id number]            FIN: [id number]               Age:   18 years     Sex:  Female     :  2000   Associated Diagnoses:   Acne vulgaris; ADHD; Well child examination   Author:   Lisa Jaffe MD      Chief Complaint   2018 3:11 PM CST    Pt here today for 18 yr well child exam.      Well Child History   Here today with mom.    Does cross country skiing.  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  Asks lots of questions.    School is going fine.  Grades are fine.  Still a struggle.    Was quite tired last night.  Sometimes crabby.  Is a little better.  Has QNRT.  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  Has seen her for about 12 sessions.  Alexa thought was helpful.  She did seem less angry and mean.  Has also talked about going to [facility] for another diagnostic testing to see before she is 20 if there is another diagnosis.  Had been taking out on mom.  IS still taking her fluoxetine.  Still has a lot of self deprecating attitudes.  Friends are also doing this.  Mom doesn't think we have quite figured out everything.    Sleep:  fine- goes to bed 10pm and up at 6am.    Appetite:  is going well.      With mom out of the room denies tobacco, alcohol, and drug use.  Not sexually active.       Review of Systems   Cardiovascular:  Heart racing happened on her ski trip.  Had to slow down and this helped.  Did not feel dizzy.  Hasn't happened for a long.  .       Health Status   Allergies:    Allergic Reactions (Selected)  Severity Not Documented  Methylphenidate 24 hour extended release (Tachycardia and heart palpitations)   Medications:  (Selected)   Prescriptions  Prescribed  FLUoxetine 10 mg oral capsule: 1 cap(s) ( 10 mg ), po, daily, # 30 cap(s), 0 Refill(s), Type: Maintenance, Pharmacy: [facility] [id number]  Documented Medications  Documented  Iron-150 oral tablet: 1 tab(s), po, daily, 0 Refill(s), Type: Maintenance  Omega-3 oral capsule: 0 Refill(s), Type:  Maintenance   Problem list:    All Problems  ADHD / [id number] / Confirmed      Histories   Past Medical History:    Active  ADHD (22831772)   Family History:    Hypertension  Father (Manolo)  Grandfather (P)  Heart attack  Grandfather (M)  Hyperlipidemia  Father (Manolo)  Sleep apnea  Father (Manolo)  Skin cancer  Grandmother (M)  Grandmother (P)     Procedure history:    Tear duct surgery in the month of 7/2003 at 19 Months.   Social History:        Alcohol Assessment: Denies Alcohol Use            Never, Use of alcohol by peers: No.      Tobacco Assessment: Denies Tobacco Use            Never, Use of tobacco by peers: No.      Substance Abuse Assessment: Denies Substance Abuse            Never, Use of drugs by peers: No.      Home and Environment Assessment            Lives with Father, Mother, 1 older sister.  Living situation: Home/Independent.  Risks in environment:               Firearm in the household - unknown if locked..        Physical Examination   Vital Signs   2/20/2018 3:11 PM CST Peripheral Pulse Rate 91 bpm  HI    HR Method Electronic    Systolic Blood Pressure 118 mmHg    Diastolic Blood Pressure 60 mmHg    Mean Arterial Pressure 79 mmHg    BP Site Right arm    BP Method Manual    Oxygen Saturation 98 %      Measurements from flowsheet : Measurements   2/20/2018 3:11 PM CST Height Measured - Metric 162.5 cm    Weight Measured - Metric 53.4 kg    BSA - Metric 1.55 m2    Body Mass Index - Metric 20.22 kg/m2    Body Mass Index Percentile 46.35      General:  Alert and oriented.    Eye:  Pupils are equal, round and reactive to light, Extraocular movements are intact, Undilated funduscopic exam:  Vessels smooth, disc margins not visualized. .    HENT:  Tympanic membranes are clear, Oral mucosa is moist, No pharyngeal erythema.    Neck:  No lymphadenopathy, No thyromegaly.    Respiratory:  Lungs clear to auscultation bilaterally.  Equal air entry.  Symmetrical chest expansion.  No wheezing.  .     Cardiovascular:  S1 and S2 with regular rate and rhythm.  No murmurs.  Pulses 2+ in all four extremities.  Brisk capillary refill.  .    Gastrointestinal:  Positive bowel sounds in all four quadrants.  Abdomen is soft, non-distended, non-tender.  No hepatosplenomegaly.  .    Genitourinary:  Normal female genitalia.  Koko stage 5 and 5.  No lesions.  .    Musculoskeletal:  No deformity, Normal gait.    Integumentary:  No rash, Moderate open and closed comedones over chest and back..    Neurologic:  No focal deficits.    Psychiatric:  Appropriate mood & affect.       Review / Management   Growth charts reviewed.      Impression and Plan   Diagnosis     Acne vulgaris (LJY30-LG L70.0).     Well child examination (NUU72-MY Z00.129).     Acne vulgaris (PXB05-YI L70.0).     ADHD (STK98-HN F90.0).     Well child examination (YLK16-XZ Z00.129).     ADHD (JOU34-OW F90.0).     Plan:  Anticipatory guidance reviewed.   Plan for Menactra next year.   Will do trial of Differin for acne.   Continue current fluoxetine.   Referral to Wally for further delineation of diagnoses.  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    RTC 6 months for recheck anxiety. .    Orders     Orders (Selected)   Outpatient Orders  Ordered  Referral (Request): 02/20/18 15:51:00 CST, Referred to: Other, Additional instructions: Wally RE:  history of anxiety and attention challenges.  Evaluate and treat, ADHD  Prescriptions  Prescribed  Differin 0.1% topical cream: 1 vicente, TOP, Once a day (at bedtime), # 45 g, 3 Refill(s), Type: Maintenance, Pharmacy: [facility] [id number], 1 vicente top hs  FLUoxetine 10 mg oral capsule: 1 cap(s) ( 10 mg ), po, daily, # 30 cap(s), 6 Refill(s), Type: Maintenance, Pharmacy: [facility] [id number], 1 cap(s) po daily.

## 2022-02-15 NOTE — TELEPHONE ENCOUNTER
---------------------  From: Linda Mccullough CMA (Phone Messages Pool ([id number]_[facility]))   To: Advanced Practice Provider Pool ([id number]_[facility]);     Sent: 4/28/2021 12:41:29 PM CDT  Subject: General Message-refill of eye ointment     Phone Message    PCP:   [facility]      Time of Call:  1225       Person Calling:  self  Phone number:  [phone]  Note:   pt having irritation again on her eyelid, asking if she can a refill of eye ointment ARM rx'd 1-2 yrs ago.    per 1/31/20 note   Topical antibiotic ointment to the eyelid issue.  Consider changing her makeup.  Can also do a small amount of hydrocortisone if needed- avoid getting into the eye.     erythromycin 0.5% ophthalmic ointment: See Instructions, Instructions: 1 application twice daily to affected area on eyelid x 5 days, # 3.5 gm, 0 Refill(s), Type: Acute, Pharmacy: [facility] [id number], 1 application twice daily to affected area on eyelid x 5 days.       [facility] [place] pharm---------------------  From: [place] CARRIZALES, Kareem SILVA (Advanced Practice Provider Pool ([id number]_[facility]))   To: Phone Messages Pool ([id number]_[place] [facility] [place]);     Sent: 4/28/2021 12:44:52 PM CDT  Subject: RE: General Message-refill of eye ointment     Rx is renewedpt contacted at 2125

## 2022-02-15 NOTE — TELEPHONE ENCOUNTER
---------------------  From: Elza Medina   Sent: 1/28/2019 4:05:21 PM CST  Subject: lab results     Informed mom of negative HCG result by phone call- informed her that Rx's were sent- verbalized understanding, no further questions or concerns.

## 2022-02-15 NOTE — NURSING NOTE
Generalized Anxiety Disorder Screening Entered On:  3/26/2019 9:30 AM CDT    Performed On:  3/22/2019 9:28 AM CDT by Iliana Frank MA               Generalized Anxiety Disorder Screening   STAR Nervous, Anxious On Edge :   More than half the days   STAR Control Worrying B :   Nearly every day   STAR Worrying Too Much :   Nearly every day   STAR Restless :   Several days   STAR Easily Annoyed/Irritable :   More than half the days   STAR Afraid :   Not at all   STAR Trouble Relaxing :   More than half the days   STAR Total Screening Score :   13    STAR Difficulty with Work, Home, Others :   Somewhat difficult   Zac RAMIREZ, Iliana - 3/26/2019 9:28 AM CDT

## 2022-02-15 NOTE — LETTER
(Inserted Image. Unable to display)   August 01, 2019      ALEXA VALADEZ   [address] [place], [place] [id number]        Dear ALEXA,      Thank you for selecting [facility] for your healthcare needs.      Alexa's Holter Monitor did not reveal any abnormalities.  Please let me know if you have any questions.  We can also discuss further at her next clinic visit.            Please contact me or my assistant at [phone] if you have any questions or concerns.     Sincerely,        Lisa Jaffe MD

## 2022-02-15 NOTE — TELEPHONE ENCOUNTER
Entered by Iliana Frank MA on February 24, 2021 10:23:32 AM CST  ---------------------  From: Iliana Frank MA   To: [facility] [id number] [facility]    Sent: 2/24/2021 10:23:32 AM CST  Subject: Medication Management     ** Submitted: **  Order:sertraline (sertraline 50 mg oral tablet)  1 tab(s)  Oral  daily  take w/ 25mg tab to get 75mg total  Qty:  30 tab(s)        Refills:  0          Substitutions Allowed     Route To Pharmacy - [facility] [id number] [facility]    Signed by Iliana Frank MA  2/24/2021 4:22:00 PM UT    ** Submitted: **  Complete:sertraline (sertraline 25 mg oral tablet)   Signed by Iliana Frank MA  2/24/2021 4:23:00 PM UT    ** Not Approved:  **  sertraline (SERTRALINE HCL 50 MG TABLET)  TAKE 1 TABLET BY MOUTH ONCE DAILY WITH THE 25MG TAB FOR A TOTAL DAILY DOSE OF 75MG.  Qty:  90 tab(s)        Days Supply:  90        Refills:  1          Substitutions Allowed     Route To Pharmacy - [facility] [id number] [facility]   Signed by Iliana Frank MA            ** Submitted: **  Order:sertraline (sertraline 25 mg oral tablet)  1 tab(s)  Oral  daily  take w/ 50mg tabs to get 75mg total.  Qty:  30 tab(s)        Refills:  0          Substitutions Allowed     Route To Pharmacy - [facility] [id number] [facility]    Signed by Iliana Frank MA  2/24/2021 4:21:00 PM UT    ** Submitted: **  Complete:sertraline (sertraline 25 mg oral tablet)   Signed by Iliana Frank MA  2/24/2021 4:22:00 PM UT    ** Submitted: **  Complete:sertraline (sertraline 50 mg oral tablet)   Signed by Iliana Frank MA  2/24/2021 4:22:00 PM UT    ** Not Approved:  **  sertraline (SERTRALINE HCL 25 MG TABLET)  TAKE 1 TABLET BY MOUTH DAILY WITH THE 50MG TAB FOR A TOTAL DAILY DOSE OF 75MG.  Qty:  90 tab(s)        Days Supply:  90        Refills:  1          Substitutions Allowed     Route To Pharmacy - [facility] [address] [facility]   Signed by Iliana Frank MA            ------------------------------------------  From: [id number]  To: Lisa Jaffe MD  Sent:  February 24, 2021 12:25:23 AM CST  Subject: Medication Management  Due: February 19, 2021 9:56:59 PM CST     ** On Hold Pending Signature **     Dispensed Drug: sertraline (sertraline 50 mg oral tablet), TAKE 1 TABLET BY MOUTH ONCE DAILY WITH THE 25MG TAB FOR A TOTAL DAILY DOSE OF 75MG.  Quantity: 90 tab(s)  Days Supply: 90  Refills: 1  Substitutions Allowed  Notes from Pharmacy:     ** On Hold Pending Signature **     Dispensed Drug: sertraline (sertraline 25 mg oral tablet), TAKE 1 TABLET BY MOUTH DAILY WITH THE 50MG TAB FOR A TOTAL DAILY DOSE OF 75MG.  Quantity: 90 tab(s)  Days Supply: 90  Refills: 1  Substitutions Allowed  Notes from Pharmacy:  ------------------------------------------LV:  9/1/2020 w/ ARM for med check; RTC due soon, message sent to pharmacy

## 2022-02-15 NOTE — PROGRESS NOTES
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        History of Present Illness   Chief complaint and symptoms as noted above and confirmed with patient.  Here today with dad.  Has had left ear pain since yesterday.  Has been swimming.  NO drainage from katharine ear.  Mom used peroxide .  Put some medication drops in there.    Medicines are going well.  Moods are okay.  Fluotine once per day.        Review of Systems   All other systems are negative      Health Status   Allergies:    Allergic Reactions (Selected)  Severity Not Documented  Methylphenidate 24 hour extended release (Tachycardia and heart palpitations)   Medications:  (Selected)   Prescriptions  Prescribed  FLUoxetine 10 mg oral capsule: 1 cap(s) ( 10 mg ), PO, Daily, # 30 cap(s), 7 Refill(s), Type: Maintenance, Pharmacy: [facility], Do not fill until mom calls, 1 cap(s) po daily  Documented Medications  Documented  Iron-150 oral tablet: 1 tab(s), po, daily, 0 Refill(s), Type: Maintenance  Omega-3 oral capsule: 0 Refill(s), Type: Maintenance   Problem list:    All Problems  ADHD / [id number] / Confirmed      Histories   Past Medical History:    Active  ADHD ([id number])   Family History:    Hypertension  Father (Manolo)  Grandfather (P)  Heart attack  Grandfather (M)  Hyperlipidemia  Father (Manolo)  Sleep apnea  Father (Manolo)  Skin cancer  Grandmother (M)  Grandmother (P)     Procedure history:    Tear duct surgery in the month of 2003 at 19 Months.   Social History:        Alcohol Assessment: Denies Alcohol Use            Never, Use of alcohol by peers: No.      Tobacco Assessment: Denies Tobacco Use            Never, Use of tobacco by peers:  No.      Substance Abuse Assessment: Denies Substance Abuse            Never, Use of drugs by peers: No.      Home and Environment Assessment            Lives with Father, Mother, 1 older sister.  Living situation: Home/Independent.  Risks in environment:               Firearm in the household - unknown if locked..        Physical Examination   Vital Signs   7/5/2017 11:01 AM CDT Temperature Tympanic 97.2 DegF  LOW    Peripheral Pulse Rate 62 bpm    Pulse Site Radial artery    HR Method Manual    Systolic Blood Pressure 100 mmHg    Diastolic Blood Pressure 60 mmHg    Mean Arterial Pressure 73 mmHg    BP Site Right arm    BP Method Manual      Measurements from flowsheet : Measurements   7/5/2017 11:01 AM CDT Height Measured - Metric 162 cm    Weight Measured - Metric 50.3 kg    BSA - Metric 1.5 m2    Body Mass Index - Metric 19.17 kg/m2    Body Mass Index Percentile 36.23      Vital signs as noted above   General:  Alert and oriented.    Eye:  Pupils are equal, round and reactive to light, Extraocular movements are intact.    HENT:  Oral mucosa is moist, No pharyngeal erythema, Left outter canal is erythematous and edematous.  White discharge present on surface of TM. .    Neck:  Few anterior nodes..    Respiratory:  Lungs clear to auscultation bilaterally.  Equal air entry.  Symmetrical chest expansion.  No wheezing.  .    Cardiovascular:  S1 and S2 with regular rate and rhythm.  No murmurs.  Pulses 2+ in all four extremities.  Brisk capillary refill.  .       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     Otitis externa (KJG56-YB H60.332).     Plan:  Continue current fluoxetine 10 mg daily.    Discussed considering trial off when Alexa and parents are ready.   Topical hydrocortisone/neomycin/polymyxin B drops to left ear x 7 days.   If not improving might consider adding oral antibiotic  as could not get a great view of TM today due to debris in the canal. .    Orders     Orders (Selected)   Prescriptions  Prescribed  FLUoxetine 10 mg oral capsule: 1 cap(s) ( 10 mg ), PO, Daily, # 30 cap(s), 7 Refill(s), Type: Maintenance, Pharmacy: [email or web address], Do not fill until mom calls, 1 cap(s) po daily  hydrocortisone/neomycin/polymyxin B 1%-0.35%-10,000 units/mL otic solution: 2 drop(s), left ear, QID, x 7 day(s), # 10 mL, 0 Refill(s), Type: Acute, Pharmacy: [email or web address], 2 drop(s) left ear qid,x7 day(s).

## 2022-02-15 NOTE — NURSING NOTE
Generalized Anxiety Disorder Screening Entered On:  10/22/2019 7:50 PM CDT    Performed On:  10/17/2019 7:49 PM CDT by Pa April               Generalized Anxiety Disorder Screening   STAR Nervous, Anxious On Edge :   Several days   STAR Control Worrying B :   Not at all   STAR Worrying Too Much :   Several days   STAR Restless :   Not at all   STAR Easily Annoyed/Irritable :   Several days   STAR Afraid :   Not at all   STAR Trouble Relaxing :   Several days   STAR Total Screening Score :   4    STAR Difficulty with Work, Home, Others :   Not difficult at all   Pa , April - 10/22/2019 7:49 PM CDT

## 2022-02-15 NOTE — NURSING NOTE
Generalized Anxiety Disorder Screening Entered On:  4/26/2021 11:44 AM CDT    Performed On:  4/22/2021 11:43 AM CDT by Lavinia Reynoso               STAR-7   STAR Nervous, Anxious On Edge :   Several days   STAR Control Worrying B :   Several days   STAR Worrying Too Much :   Several days   STAR Trouble Relaxing :   Not at all   STAR Restless :   Not at all   STAR Easily Annoyed/Irritable :   Several days   STAR Afraid :   Not at all   STAR Total Screening Score :   4    STAR Difficulty with Work, Home, Others :   Not difficult at all   Lavinia Reynoso - 4/26/2021 11:43 AM CDT

## 2022-02-15 NOTE — TELEPHONE ENCOUNTER
---------------------  From: Naun ALVAREZ, Bailey PRESLEY   Sent: 7/17/2019 2:44:03 PM CDT  Subject: 7 day cardiokey placement     Placed CardioKey holter @ 1439 per ARM for dx of Tachycardia. Gave patient and father written & verbal instructions and sent patient home with all supplies. Patient indicated she understood. End of service date is 7/24/19.Printed Holter Monitor Report and routed to [facility] for interpretation.

## 2022-02-15 NOTE — NURSING NOTE
Comprehensive Intake Entered On:  4/22/2021 8:43 AM CDT    Performed On:  4/22/2021 8:42 AM CDT by Callum Begum               Summary   Chief Complaint :   Med check.     Menstrual Status :   Menarcheal   Weight Measured - Metric :   52.6 kg(Converted to: 115 lb 15 oz, 115.963 lb)    Height Measured - Metric :   162.7 cm(Converted to: 5 ft 4 in, 5.34 ft, 1.63 m)    Body Mass Index - Metric :   19.87 kg/m2   BSA - Metric :   1.54 m2   Systolic Blood Pressure :   102 mmHg   Diastolic Blood Pressure :   62 mmHg   Mean Arterial Pressure :   75 mmHg   Peripheral Pulse Rate :   87 bpm   BP Site :   Right arm   BP Method :   Manual   HR Method :   Electronic   Temperature Tympanic :   97.7 DegF(Converted to: 36.5 DegC)  (LOW)    Oxygen Saturation :   97 %   Callum Begum - 4/22/2021 8:42 AM CDT   Meds / Allergies   (As Of: 4/22/2021 8:43:32 AM CDT)   Allergies (Active)   No Known Medication Allergies  Estimated Onset Date:   Unspecified ; Created By:   Agnieszka Amaral CMA; Reaction Status:   Active ; Category:   Drug ; Substance:   No Known Medication Allergies ; Type:   Allergy ; Updated By:   Agnieszka Amaral CMA; Reviewed Date:   4/22/2021 8:42 AM CDT        Medication List   (As Of: 4/22/2021 8:43:32 AM CDT)   Prescription/Discharge Order    sertraline  :   sertraline ; Status:   Prescribed ; Ordered As Mnemonic:   sertraline 50 mg oral tablet ; Simple Display Line:   1 tab(s), Oral, daily, for 14 day(s), DOSE. SEE DR FOR FUTURE REFILLS., 14 tab(s), 0 Refill(s) ; Ordering Provider:   Lisa Jaffe MD; Catalog Code:   sertraline ; Order Dt/Tm:   3/23/2021 11:18:30 AM CDT          ethinyl estradiol-norgestimate  :   ethinyl estradiol-norgestimate ; Status:   Prescribed ; Ordered As Mnemonic:   Ortho Tri-Cyclen oral tablet ; Simple Display Line:   1 tab(s), PO, Daily, 84 tab(s), 4 Refill(s) ; Ordering Provider:   Lisa Jaffe MD; Catalog Code:   ethinyl estradiol-norgestimate ; Order Dt/Tm:    9/1/2020 3:58:18 PM CDT            Home Meds    ascorbic acid  :   ascorbic acid ; Status:   Documented ; Ordered As Mnemonic:   Vitamin C 1000 mg oral tablet ; Simple Display Line:   1,000 mg, 1 tab(s), Oral, daily, 0 Refill(s) ; Catalog Code:   ascorbic acid ; Order Dt/Tm:   3/22/2019 11:47:20 AM CDT          cholecalciferol  :   cholecalciferol ; Status:   Documented ; Ordered As Mnemonic:   Vitamin D3 1000 intl units oral capsule ; Simple Display Line:   1,000 International Unit, 1 cap(s), Oral, daily, 0 Refill(s) ; Catalog Code:   cholecalciferol ; Order Dt/Tm:   3/22/2019 11:46:51 AM CDT          multivitamin with minerals  :   multivitamin with minerals ; Status:   Documented ; Ordered As Mnemonic:   multivitamin with minerals (w/ Iron) ; Simple Display Line:   See Instructions, 0 Refill(s) ; Catalog Code:   multivitamin with minerals ; Order Dt/Tm:   3/22/2019 11:47:37 AM CDT          omega-3 polyunsaturated fatty acids  :   omega-3 polyunsaturated fatty acids ; Status:   Documented ; Ordered As Mnemonic:   Fish Oil ; Simple Display Line:   Oral, 0 Refill(s) ; Catalog Code:   omega-3 polyunsaturated fatty acids ; Order Dt/Tm:   3/22/2019 11:47:00 AM CDT            ID Risk Screen   Recent Travel History :   No recent travel   Family Member Travel History :   No recent travel   Other Exposure to Infectious Disease :   Unknown   COVID-19 Testing Status :   No COVID-19 test performed   Callum Begum - 4/22/2021 8:42 AM CDT   Social History   Social History   (As Of: 4/22/2021 8:43:32 AM CDT)   Alcohol:        Never, Use of alcohol by peers: No.   (Last Updated: 1/9/2014 1:48:11 PM CST by Sirisah Brar MA)          Tobacco:        Never, Use of tobacco by peers: No.   (Last Updated: 1/9/2014 1:48:00 PM CST by Sirisha Brar MA)   Never (less than 100 in lifetime)   (Last Updated: 4/22/2021 8:43:25 AM CDT by Callum Begum)          Electronic Cigarette/Vaping:        Electronic Cigarette  Use: Never.   (Last Updated: 4/22/2021 8:43:30 AM CDT by Callum Begum)          Substance Abuse:        Never, Use of drugs by peers: No.   (Last Updated: 1/9/2014 1:48:24 PM CST by Sirisha Brar MA)          Home/Environment:        Lives with Father, Mother, 1 older sister.  Living situation: Home/Independent.  Risks in environment: Firearm in the household - unknown if locked..   (Last Updated: 2/1/2016 5:16:37 PM CST by Faith Jackson)

## 2022-02-15 NOTE — PROGRESS NOTES
Patient:   RICARDO VALADEZ            MRN: [id number]            FIN: [id number]               Age:   19 years     Sex:  Female     :  2000   Associated Diagnoses:   Anxiety, generalized; Tachycardia   Author:   Lisa Jaffe MD      Chief Complaint   10/17/2019 1:59 PM CDT   Patient presents for anxiety follow-up.      History of Present Illness   Chief complaint and symptoms as noted above and confirmed with patient.  Here today for follow up on medications.  Is a senior in high school.  Will graduate early and take some college classes.  Will still walk with her class this summer.  Anxiety medication is helping a ton.  The oral contraceptives are helping immensely with her mood swings.  Is getting along well with her family right now.  Is still working a lot after school- [employer].  Is planning an expedition.  Is involved in her [employer]- teens encountering [employer]; has an outreach event this weekend.        Review of Systems   Constitutional:  Negative.    Eye:  Negative.    Ear/Nose/Mouth/Throat:  Negative.    Respiratory:  Negative.    Cardiovascular:  Still has tachycardia occasionally.  Has an aunt with hyperthyroidism- wonders if she should be checked.  Denies other hyperthyroidism symptoms.    Gastrointestinal:  Negative.    Psychiatric:  Negative except as documented in history of present illness.       Health Status   Allergies:    Allergic Reactions (Selected)  No Known Medication Allergies   Medications:  (Selected)   Prescriptions  Prescribed  Ortho Tri-Cyclen oral tablet: 1 tab(s), PO, Daily, # 84 tab(s), 4 Refill(s), Type: Maintenance, Pharmacy: [facility], 1 tab(s) Oral daily  Zoloft 50 mg oral tablet: = 1 tab(s) ( 50 mg ), PO, Daily, # 90 tab(s), 1 Refill(s), Type: Maintenance, Pharmacy: [facility], 1 tab(s) Oral daily  Documented Medications  Documented  Fish Oil: Oral, 0 Refill(s), Type: Maintenance  Vitamin C 1000 mg oral tablet: = 1 tab(s) ( 1,000 mg ),  Oral, daily, 0 Refill(s), Type: Maintenance  Vitamin D3 1000 intl units oral capsule: = 1 cap(s) ( 1,000 International Unit ), Oral, daily, 0 Refill(s), Type: Maintenance  multivitamin with minerals (w/ Iron): See Instructions, 0 Refill(s), Type: Maintenance   Problem list:    All Problems  ADHD / [id number] / Confirmed      Histories   Past Medical History:    Active  ADHD ([id number])   Family History:    Attention deficit disorder  Father (Manolo)  Anxiety  Grandmother (P)  Hypertension  Father (Manolo)  Grandfather (P)  Migraine  Great Aunt (M)  Heart attack  Grandfather (M)  Hyperlipidemia  Father (Manolo)  Sleep apnea  Father (Manolo)  Skin cancer  Grandmother (M)  Grandmother (P)     Procedure history:    Tear duct surgery in the month of 7/2003 at 20 Months.   Social History:        Alcohol Assessment            Never, Use of alcohol by peers: No.      Tobacco Assessment            Never, Use of tobacco by peers: No.      Substance Abuse Assessment            Never, Use of drugs by peers: No.      Home and Environment Assessment            Lives with Father, Mother, 1 older sister.  Living situation: Home/Independent.  Risks in environment:               Firearm in the household - unknown if locked..        Physical Examination   Vital Signs   10/17/2019 1:59 PM CDT Temperature Tympanic 97.3 DegF  LOW    Peripheral Pulse Rate 67 bpm    HR Method Electronic    Systolic Blood Pressure 92 mmHg    Diastolic Blood Pressure 60 mmHg    Mean Arterial Pressure 71 mmHg    BP Site Right arm    BP Method Manual    Oxygen Saturation 97 %      Measurements from flowsheet : Measurements   10/17/2019 1:59 PM CDT Height Measured - Metric 162.56 cm    Weight Measured - Metric 52 kg    BSA - Metric 1.53 m2    Body Mass Index - Metric 19.68 kg/m2    Body Mass Index Percentile 29.59      Vital signs as noted above   General:  Alert and oriented, No acute distress, Bright affect, confident, good eye contact, smiles.    Respiratory:  Lungs  clear to auscultation bilaterally.  Equal air entry.  Symmetrical chest expansion.  No wheezing.  .    Cardiovascular:  S1 and S2 with regular rate and rhythm.  No murmurs.  Pulses 2+ in all four extremities.  Brisk capillary refill.  .       Review / Management   PHQ-A: 2/27  STAR 7: 4      Impression and Plan   Diagnosis     Anxiety, generalized (ZNV15-HN F41.1).     Tachycardia (ECK05-YL R00.0).     Plan:  Continue current Zoloft and oral contraceptives as prescribed.    Will check screening thyroid and hemoglobin labs today related to the ongoing tachycardia issues.   Flu vaccine given today.   RTC 6 months, sooner if needed. .    Orders     Orders (Selected)   Prescriptions  Prescribed  Zoloft 50 mg oral tablet: = 1 tab(s) ( 50 mg ), PO, Daily, # 90 tab(s), 1 Refill(s), Type: Maintenance, Pharmacy: [facility] [id number] [facility], 1 tab(s) Oral daily.

## 2022-02-15 NOTE — PROGRESS NOTES
Patient:   RICARDO VALADEZ            MRN: [id number]            FIN: [id number]               Age:   19 years     Sex:  Female     :  2000   Associated Diagnoses:   Well child examination; Acne vulgaris; Acute actinic otitis externa, bilateral; ADHD; Anxiety; Contraception management   Author:   Lisa Jaffe MD      Chief Complaint   2019 2:44 PM CST    Pt here for 19yr well child      Well Child History   Parent concerns: Here today with mom for 19-year well-child.    1.  Ongoing concerns about anxiety and ADHD symptoms.  Does now have a 504 at school and will get extra time for ECT.  Has been using CBD oil but unsure if this is been helping.  Is at a point where they could stop this and switch to something different.  Mom notes she still crabby in the morning.  Does seem calmer after taking the CBD oil.  Paternal grandmother with anxiety, never medicated to mom's knowledge.  Dad with possible ADHD although was never diagnosed.  She notes multiple times on her form that she feels quite stressed out.  When I asked her about this specifically she refers to the ACT coming up.    2.  Has acne.  Did try topical things but mom is wondering if an oral contraceptive might help manage.  Is worse on her chest and back.  No concerns about her periods other than significant premenstrual symptoms.  They are regular.  Denies sexual activity.  Last menstrual period: January 10.    3. left ear pain.  Family just got back from a week long trip to [place].  Was swimming frequently.     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  Does not have any homework at night.  Is thinking about doing a gap year prior to college.  Is interested in early childhood education.  Does a great job with kids.  Has shadowed at a .  Has not made any friends at her new school but does occasionally see friends from her old school.    No concerns about dietary intake.  No body image concerns.    Sleep: Only  difficulties getting her awake in the morning.  Does use melatonin to fall asleep.           Review of Systems   Constitutional:  Negative.    Eye:  Negative.    Ear/Nose/Mouth/Throat:  Negative.    Respiratory:  Negative.    Cardiovascular:  Some heart palpitations with physical activity.  Mom previously thought this was related to the Concerta. .    Gastrointestinal:  Negative.    Genitourinary:  Negative.    Musculoskeletal:  Negative.    Integumentary:  Negative.       Health Status   Allergies:    Allergic Reactions (Selected)  Severity Not Documented  Methylphenidate 24 hour extended release (Tachycardia and heart palpitations)   Medications:  (Selected)   Documented Medications  Documented  CBD oil: CBD oil, Supply, 0 Refill(s), Type: Maintenance   Problem list:    All Problems  ADHD / [id number] / Confirmed      Histories   Past Medical History:    Active  ADHD ([id number])   Family History:    Hypertension  Father (Manolo)  Grandfather (P)  Heart attack  Grandfather (M)  Hyperlipidemia  Father (Manolo)  Sleep apnea  Father (Manolo)  Skin cancer  Grandmother (M)  Grandmother (P)     Procedure history:    Tear duct surgery in the month of 7/2003 at 20 Months.   Social History:        Alcohol Assessment            Never, Use of alcohol by peers: No.      Tobacco Assessment            Never, Use of tobacco by peers: No.      Substance Abuse Assessment            Never, Use of drugs by peers: No.      Home and Environment Assessment            Lives with Father, Mother, 1 older sister.  Living situation: Home/Independent.  Risks in environment:               Firearm in the household - unknown if locked..      Physical Examination   Vital Signs   1/28/2019 2:44 PM CST Temperature Tympanic 98.7 DegF    Peripheral Pulse Rate 64 bpm    Pulse Site Radial artery    HR Method Manual    Systolic Blood Pressure 112 mmHg    Diastolic Blood Pressure 70 mmHg    Mean Arterial Pressure 84 mmHg    BP Site Right arm    BP Method Manual       Measurements from flowsheet : Measurements   1/28/2019 2:44 PM CST Height Measured - Metric 162.5 cm    Weight Measured - Metric 52.3 kg    BSA - Metric 1.54 m2    Body Mass Index - Metric 19.81 kg/m2    Body Mass Index Percentile 34.94      General:  Alert and oriented, No acute distress.    Eye:  Pupils are equal, round and reactive to light, Extraocular movements are intact, Undilated funduscopic exam:  Vessels smooth, disc margins not visualized. .    HENT:  Tympanic membranes are clear, Oral mucosa is moist, No pharyngeal erythema.    Neck:  No lymphadenopathy, No thyromegaly.    Respiratory:  Lungs clear to auscultation bilaterally.  Equal air entry.  Symmetrical chest expansion.  No wheezing.  .    Cardiovascular:  S1 and S2 with regular rate and rhythm.  No murmurs.  Pulses 2+ in all four extremities.  Brisk capillary refill.  .    Gastrointestinal:  Positive bowel sounds in all four quadrants.  Abdomen is soft, non-distended, non-tender.  No hepatosplenomegaly.  .    Genitourinary:  Normal female genitalia.  Koko stage 5 and 5.  .    Musculoskeletal:  Normal gait.    Integumentary:  No rash.    Neurologic:  No focal deficits, Normal deep tendon reflexes.    Psychiatric:  Appropriate mood & affect.       Review / Management   Growth charts reviewed with family.      Impression and Plan   Diagnosis     Well child examination (LNO72-VR Z00.129).     Acne vulgaris (MFU74-CH L70.0).     Acute actinic otitis externa, bilateral (KOW44-LT H60.513).     ADHD (TNX57-TM F90.9).     Anxiety (BYN83-BC F41.9).     Contraception management (WLZ07-RY Z30.9).     Plan:  Anticipatory guidance reviewed:  Open communication with parents, daily breakfast, physical activity, avoidance drugs/alcohol/tobacco, car safety.   Discussed a multitude of options regarding the anxiety, ADHD and mood issue.  Discussed if we are using medication specifically geared towards anxiety and ADHD I would recommend discontinuing the CBD oil  as it is unknown if there would be any interactions.  Briefly discussed an alternative to fluoxetine she previously was on including Zoloft or citalopram.  We will do a trial of hormone oral contraceptives for the acne, premenstrual symptoms.  If not doing well after 2 months on this from an acne standpoint will consider a 3-month course of doxycycline.  Ciprodex topically for the otitis externa.  Immunizations are up-to-date including flu vaccine.  Vision screen today acceptable with her glasses on.  Return to clinic for 20-year well-child.  Return to clinic in 2 months for recheck on ADHD, anxiety and oral contraceptives.  .    Orders     Orders (Selected)   Prescriptions  Prescribed  Ciprodex 0.3%-0.1% otic suspension: 4 drop(s), Ear-Both, bid, x 7 day(s), # 7.5 mL, 0 Refill(s), Type: Acute, Pharmacy: [email or web address], 4 drop(s) Ear-Both bid,x7 day(s)  Ortho Tri-Cyclen oral tablet: 1 tab(s), PO, Daily, # 28 tab(s), 11 Refill(s), Type: Maintenance, Pharmacy: [email or web address], 1 tab(s) Oral daily.

## 2022-02-15 NOTE — LETTER
(Inserted Image. Unable to display)         March 04, 2021        RICARDO VALADEZ   [address] [place], [place] [id number]        Dear RICARDO,    Thank you for selecting [facility] for your healthcare needs.    Our records indicate you are due for the following services:     Medication Check      (FYI   Regarding office visits: In some instances, a video visit or telephone visit may be offered as an option.)    To schedule an appointment or if you have further questions, please contact your clinic at [phone].    Powered by [employer]    Sincerely,    Lisa Jaffe MD

## 2022-02-15 NOTE — NURSING NOTE
Depression Screening Entered On:  5/22/2019 12:03 PM CDT    Performed On:  5/16/2019 12:02 PM CDT by Josette Lehman CMA               Depression Screening   Little Interest - Pleasure in Activities :   Not at all   Feeling Down, Depressed, Hopeless :   Not at all   Initial Depression Screen Score :   0    Trouble Falling or Staying Asleep :   Several days   Feeling Tired or Little Energy :   Not at all   Poor Appetite or Overeating :   Not at all   Feeling Bad About Yourself :   Several days   Trouble Concentrating :   Not at all   Moving or Speaking Slowly :   Not at all   Thoughts Better Off Dead or Hurting Self :   Not at all   Detailed Depression Screen Score :   2    Total Depression Screen Score :   2    Josette Lehman CMA - 5/22/2019 12:02 PM CDT

## 2022-02-15 NOTE — TELEPHONE ENCOUNTER
Entered by Jasmin Navarro CMA on August 10, 2020 9:19:11 AM CDT  Date of last office visit and reason:  1/31/2020 Med check w/ ARM      Date of last Med Check / Px:   _  Date of last labs pertaining to med:  _     RTC order in chart:  Was due back last month. One month protocol today    For Protocol refill, has patient been contacted:  Letter/message        ------------------------------------------  From: [facility] [id number] [facility]  To: Lisa Jaffe MD  Sent: August 8, 2020 12:30:53 PM CDT  Subject: Medication Management  Due: July 28, 2020 10:29:52 PM CDT     ** On Hold Pending Signature **     Dispensed Drug: sertraline (sertraline 50 mg oral tablet), TAKE 1 TABLET BY MOUTH EVERY DAY  Quantity: 90 tab(s)  Days Supply: 90  Refills: 1  Substitutions Allowed  Notes from Pharmacy:  ---------------------------------------------------------------  From: Jasmin Navarro CMA   To: [facility] [id number] [facility]    Sent: 8/10/2020 9:19:39 AM CDT  Subject: Medication Management     ** Submitted: **  Order:sertraline (sertraline 50 mg oral tablet)  1 tab(s)  Oral  daily  Qty:  30 tab(s)        Refills:  0          Substitutions Allowed     Route To Pharmacy - [place] [id number] [facility]    Signed by Jasmin Navarro CMA  8/10/2020 2:19:00 PM [facility]    ** Submitted: **  Complete:sertraline (Zoloft 50 mg oral tablet)   Signed by Jasmin Navarro CMA  8/10/2020 2:19:00 PM [facility]    ** Not Approved:  **  sertraline (SERTRALINE HCL 50 MG TABLET)  TAKE 1 TABLET BY MOUTH EVERY DAY  Qty:  90 tab(s)        Days Supply:  90        Refills:  1          Substitutions Allowed     Route To Pharmacy - [place] [id number] [facility]   Signed by Jasmin Navarro CMA

## 2022-02-15 NOTE — TELEPHONE ENCOUNTER
---------------------  From: Lisa Jaffe MD   To: Phone Messages Pool ([id number] - [place]);     Sent: 10/22/2019 8:42:28 PM CDT  Subject: lab results     Please call family- Alexa's labs were all within normal limits- no evidence her thyroid should be a cause of the tachycardia.  Thanks.         Results:  Date Result Name Value Ref Range   10/17/2019 2:33 PM T4 Free 0.9 ng/dL (0.8 - 1.4)   10/17/2019 2:33 PM TSH 1.25 mIU/L    10/17/2019 2:33 PM Hgb 15.2 gm/dL (11.5 - 15.3)LM for adrien Melo to call back.Manolo, patients father called back and was informed of results.

## 2022-02-15 NOTE — TELEPHONE ENCOUNTER
---------------------  From: Vane Delong CMA   To: Appointment Pool ([id number]);     Sent: 3/23/2021 11:21:10 AM CDT  Subject: appt needed for med refill     Pt needs OV for med check for further refills with ARM or MJP for anxiety/depression. 14 day supply sent to pharmacy per protocol.  Vane Delong CMA.CALLED [facility] FOR PATIENT TO CALL BACK AND SCHEDULEscheduled

## 2022-02-15 NOTE — LETTER
(Inserted Image. Unable to display)     August 03, 2020      RICARDO VALADEZ   [address] [place], [place] [id number]          Dear RICARDO,      Thank you for selecting [facility] for your healthcare needs.      Our records indicate you are due for the following services:     Follow-up office visit / Medication Check.      To schedule an appointment or if you have further questions, please contact your primary clinic:   [facility]       [phone]   [facility]       [phone]              [facility]     [phone]      Powered by [employer]    Sincerely,    Lisa Jaffe MD

## 2022-02-15 NOTE — PROGRESS NOTES
Patient:   RICARDO VALADEZ            MRN: [id number]            FIN: [id number]               Age:   17 years     Sex:  Female     :  2000   Associated Diagnoses:   Acne vulgaris; Encounter for well child check without abnormal findings   Author:   Lisa Jaffe MD      Chief Complaint   2017 10:11 AM CST   Patient presents today with mother for a 17 year WCC and for a medication check of her fluoxetine 10 mg daily for her depression. She was on Methylphenidate 36 mg ER daily but d/c because of tachycardia. Denies any issues with her medications.        Well Child History   Here today with mom for 17 year well-child exam.  Mom has no concerns.  Feels fluoxetine is going well.  Is seeing Juan C cream.  Does seem to be helping especially with parents for de-escalating if she has a fit.  Still is on the 10 mg once daily.  Does have concerns about acne on her back and chest.  Also has bumps on her neck and a wart on her foot.  Not hurting her.  Has tried multiple treatments without improvement.  Family not interested in treatment today.  Has been off ADHD medicines secondary to the tachycardia.  Denies any tachycardia symptoms since then.  Denies diarrhea, sweating episodes.  No weight loss that parents are aware of.  Diet: Appetite is improved significantly especially at lunch since being off her ADHD medications.  Sleep: No concerns.  With mom out of the room denies tobacco, alcohol, drug use.  Is not dating anyone but interested in boys.  Not sexually active.       Review of Systems   Constitutional:  Negative.    Eye:  Negative.    Ear/Nose/Mouth/Throat:  Negative.    Respiratory:  Negative.    Cardiovascular:  Negative.    Gastrointestinal:  Negative.    Genitourinary:  Negative.    Musculoskeletal:  Negative.    Integumentary:  Negative.       Health Status   Allergies:    Allergic Reactions (Selected)  Severity Not Documented  Methylphenidate 24 hour extended release (Tachycardia and heart palpitations)    Medications:  (Selected)   Prescriptions  Prescribed  FLUoxetine 10 mg oral capsule: 1 cap(s) ( 10 mg ), PO, Daily, # 30 cap(s), 6 Refill(s), Type: Maintenance, Pharmacy: [facility] [id number] [facility], Do not fill until mom calls, 1 cap(s) po daily  Documented Medications  Documented  Iron-150 oral tablet: 1 tab(s), po, daily, 0 Refill(s), Type: Maintenance   Problem list:    All Problems  ADHD / [id number] / Confirmed      Histories   Past Medical History:    Active  ADHD ([id number])   Family History:    Hypertension  Father (Manolo)  Grandfather (P)  Heart attack  Grandfather (M)  Hyperlipidemia  Father (Manolo)  Sleep apnea  Father (Manolo)  Skin cancer  Grandmother (M)  Grandmother (P)     Procedure history:    Tear duct surgery in the month of 7/2003 at 19 Months.   Social History:        Alcohol Assessment: Denies Alcohol Use            Never, Use of alcohol by peers: No.      Tobacco Assessment: Denies Tobacco Use            Never, Use of tobacco by peers: No.      Substance Abuse Assessment: Denies Substance Abuse            Never, Use of drugs by peers: No.      Home and Environment Assessment            Lives with Father, Mother, 1 older sister.  Living situation: Home/Independent.  Risks in environment:               Firearm in the household - unknown if locked..        Physical Examination   Vital Signs   1/20/2017 10:11 AM CST Temperature Tympanic 98.1 DegF    Peripheral Pulse Rate 70 bpm    HR Method Electronic    Respiratory Rate 20 br/min    Systolic Blood Pressure 102 mmHg    Diastolic Blood Pressure 66 mmHg    Mean Arterial Pressure 78 mmHg    BP Site Right arm    BP Method Manual    Oxygen Saturation 98 %      Measurements from flowsheet : Measurements   1/20/2017 10:11 AM CST Height Measured - Metric 162 cm    Weight Measured - Metric 48.9 kg    BSA - Metric 1.48 m2    Body Mass Index - Metric 18.63 kg/m2    Body Mass Index Percentile 31.54      Eye:  Pupils are equal, round and reactive to light, Extraocular  movements are intact, Undilated funduscopic exam:  Vessels smooth, disc margins not visualized. .    HENT:  Tympanic membranes are clear, Oral mucosa is moist, No pharyngeal erythema, No sinus tenderness.    Neck:  No lymphadenopathy, No thyromegaly.    Respiratory:  Lungs clear to auscultation bilaterally.  Equal air entry.  Symmetrical chest expansion.  No wheezing.  .    Cardiovascular:  S1 and S2 with regular rate and rhythm.  No murmurs.  Pulses 2+ in all four extremities.  Brisk capillary refill.  .    Gastrointestinal:  Positive bowel sounds in all four quadrants.  Abdomen is soft, non-distended, non-tender.  No hepatosplenomegaly.  .    Genitourinary:   Breast Koko stage IV.   exam deferred secondary to menses. .    Musculoskeletal:  Normal gait.    Integumentary:  Moderate pustular acne noted over back and chest. .    Neurologic:  No focal deficits.    Psychiatric:  Appropriate mood & affect, Normal judgment.       Impression and Plan   Diagnosis     Acne vulgaris (FSN20-IY L70.0).     Encounter for well child check without abnormal findings (ORW00-CE Z00.129).     Plan:  Immunizations up-to-date including flu vaccine.  We will do a trial of tretinoin and topical clindamycin.  Could change to BenzaClin to [facility] insurance issues.  Continue fluoxetine 10 mg once daily.  For the wart discussed over-the-counter salicylic acid/Compound W and occluding with duct tape for 24-48 hours.  Should repeat treatments until wart has resolved.  Return to clinic in 6 months for recheck on depression medication.  Return to clinic for well-child exam in 1 year..    Orders     Orders (Selected)   Prescriptions  Prescribed  FLUoxetine 10 mg oral capsule: 1 cap(s) ( 10 mg ), PO, Daily, # 30 cap(s), 7 Refill(s), Type: Maintenance, Pharmacy: [facility], Do not fill until mom calls, 1 cap(s) po daily  clindamycin 1% topical gel: 1 vicente, TOP, BID, # 30 g, 11 Refill(s), Type: Maintenance, Pharmacy: [facility],  Apppropriate substitution if needed, 1 vicente top bid  tretinoin 0.025% topical gel: 1 vicente, top, hs, # 45 gm, 11 Refill(s), Type: Maintenance, Pharmacy: [facility] [id number] [facility], Appropriat substitution is okay if needed, 1 vicente top hs.

## 2022-02-15 NOTE — PROGRESS NOTES
Patient:   RICARDO VALADEZ            MRN: [id number]            FIN: [id number]               Age:   20 years     Sex:  Female     :  2000   Associated Diagnoses:   Contraception management; Anxiety   Author:   Lisa Jaffe MD      Chief Complaint   2020 3:38 PM CDT     Med check      History of Present Illness   Chief complaint and symptoms as noted above and confirmed with patient.  Here today for follow-up on her anxiety medication.  Overall feels things are going okay.  This summer was supposed to participate in a large canoeing trip up in [place].  Went up there and was there for about 2 days but found her anxiety was too overwhelming and she had to come home.  She was having crying spells and was so anxious that she was vomiting.  This is never happened to her before.  Also recently she and her dad got into an argument that was out of the ordinary for her.  She does wonder if medication should be increased slightly related to this.  States that her moods are a little bit more liable when it is near her menstrual cycle.  Is taking her oral contraceptives although occasionally she does forget a day.    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  She really likes the [employer] job.  Plans to try and go back to school next year when she can attend in person.         Review of Systems   All other systems are negative      Health Status   Allergies:    Allergic Reactions (Selected)  No Known Medication Allergies   Medications:  (Selected)   Prescriptions  Prescribed  Ortho Tri-Cyclen oral tablet: 1 tab(s), PO, Daily, # 28 tab(s), 11 Refill(s), Type: Maintenance, [id number], 1 tab(s) Oral daily  sertraline 50 mg oral tablet: = 1 tab(s), Oral, daily, # 30 tab(s), 0 Refill(s), Type: Maintenance, [id number], Due for med check, 162.5, cm, 20 9:20:00 CST, Height Measured - Metric, 52.3, kg, 20 9:20:00  CST, Weight Measured - Metric  Documented Medications  Documented  Fish Oil: Oral, 0 Refill(s), Type: Maintenance  Vitamin C 1000 mg oral tablet: = 1 tab(s) ( 1,000 mg ), Oral, daily, 0 Refill(s), Type: Maintenance  Vitamin D3 1000 intl units oral capsule: = 1 cap(s) ( 1,000 International Unit ), Oral, daily, 0 Refill(s), Type: Maintenance  multivitamin with minerals (w/ Iron): See Instructions, 0 Refill(s), Type: Maintenance,    Medications          *denotes recorded medication          *Vitamin C 1000 mg oral tablet: 1,000 mg, 1 tab(s), Oral, daily, 0 Refill(s).          *Vitamin D3 1000 intl units oral capsule: 1,000 International Unit, 1 cap(s), Oral, daily, 0 Refill(s).          Ortho Tri-Cyclen oral tablet: 1 tab(s), PO, Daily, 28 tab(s), 11 Refill(s).          *multivitamin with minerals (w/ Iron): See Instructions, 0 Refill(s).          *Fish Oil: Oral, 0 Refill(s).          sertraline 50 mg oral tablet: 1 tab(s), Oral, daily, 30 tab(s), 0 Refill(s).       Problem list:    All Problems  ADHD / [id number] / Confirmed      Histories   Past Medical History:    Active  ADHD ([id number])   Family History:    Attention deficit disorder  Father (Manolo)  Anxiety  Grandmother (P)  Hypertension  Father (Manolo)  Grandfather (P)  Migraine  Great Aunt (M)  Heart attack  Grandfather (M)  Hyperlipidemia  Father (Manolo)  Sleep apnea  Father (Manolo)  Skin cancer  Grandmother (M)  Grandmother (P)     Procedure history:    Tear duct surgery in the month of 7/2003 at 20 Months.   Social History:        Alcohol Assessment            Never, Use of alcohol by peers: No.      Tobacco Assessment            Never, Use of tobacco by peers: No.      Substance Abuse Assessment            Never, Use of drugs by peers: No.      Home and Environment Assessment            Lives with Father, Mother, 1 older sister.  Living situation: Home/Independent.  Risks in environment:               Firearm in the household - unknown if locked..        Physical  Examination   Vital Signs   9/1/2020 3:38 PM CDT Temperature Tympanic 97.9 DegF    Peripheral Pulse Rate 84 bpm    HR Method Electronic    Systolic Blood Pressure 118 mmHg    Diastolic Blood Pressure 60 mmHg    Mean Arterial Pressure 79 mmHg    BP Site Right arm    BP Method Manual    Oxygen Saturation 99 %      Measurements from flowsheet : Measurements   9/1/2020 3:38 PM CDT Height Measured - Metric 161.9 cm    Height/Length Z-score -0.20    Weight Measured - Metric 50.6 kg    Weight Percentile 21.08    Weight Z-score -0.80    BSA - Metric 1.51 m2    Body Mass Index - Metric 19.3 kg/m2    Body Mass Index Percentile 20.90    BMI Z-score -0.81      Vital signs as noted above   General:  Alert and oriented, No acute distress.    Psychiatric:  Cooperative, Appropriate mood & affect, Normal judgment, Non-suicidal, Good eye contact.  Does a great job with history and advocating for herself.       Review / Management   Jonas 7: 5  PHQ 9: 3 27.      Impression and Plan   Diagnosis     Contraception management ([id number] Z30.9).     Anxiety (ACA11-ET F41.9).     Plan:  We will increase her Zoloft to 75 mg once daily.  Will need to take a 25 mg and 50 mg tablet for this to occur.  Should touch base if she is not noticing an improvement or things are not going as well as anticipated.  Otherwise follow-up in approximately 6 months.  .    Orders     Orders (Selected)   Prescriptions  Prescribed  Ortho Tri-Cyclen oral tablet: 1 tab(s), PO, Daily, # 84 tab(s), 4 Refill(s), Type: Maintenance, Pharmacy: [facility], 1 tab(s) Oral daily, 161.9, cm, 09/01/20 15:38:00 CDT, Height Measured - Metric, 50.6, kg, 09/01/20 15:38:00 CDT, Weight Measured - Metric  sertraline 25 mg oral tablet: = 1 tab(s) ( 25 mg ), Oral, daily, Instructions: 75 mg daily, # 90 tab(s), 1 Refill(s), Type: Maintenance, Pharmacy: [facility], 1 tab(s) Oral daily,Instr:75 mg daily, 161.9, cm, 09/01/20 15:38:00 CDT, Height Measured - Metric, 50.6, kg,  09/0...  sertraline 50 mg oral tablet: = 1 tab(s), Oral, daily, Instructions: 75mg daily, # 90 tab(s), 1 Refill(s), Type: Maintenance, Pharmacy: [id number], 1 tab(s) Oral daily,Instr:75mg daily, 161.9, cm, 09/01/20 15:38:00 CDT, Height Measured - Metric, 50.6, kg, 09/01/20 15:38:0....

## 2022-02-15 NOTE — LETTER
(Inserted Image. Unable to display)         January 30, 2020        RICARDO VALADEZ   [address] [place], [place] [id number]        Dear RICARDO,    Thank you for selecting [facility] for your healthcare needs.    Our records indicate you are due for the following services:     Annual Physical     To schedule an appointment or if you have further questions, please contact your primary clinic:   [facility]       [phone]   [facility]       [phone]              [facility]     [phone]      Powered by [employer]    Sincerely,    Lisa Jaffe MD

## 2022-02-15 NOTE — NURSING NOTE
Depression Screening Entered On:  9/19/2020 9:01 AM CDT    Performed On:  9/1/2020 9:00 AM CDT by Lavinia Reynoso               Depression Screening   Little Interest - Pleasure in Activities :   Several days   Feeling Down, Depressed, Hopeless :   Not at all   Initial Depression Screen Score :   1 Score   Poor Appetite or Overeating :   Not at all   Trouble Falling or Staying Asleep :   Not at all   Feeling Tired or Little Energy :   Several days   Feeling Bad About Yourself :   Several days   Trouble Concentrating :   Not at all   Moving or Speaking Slowly :   Not at all   Thoughts Better Off Dead or Hurting Self :   Not at all   STAR Difficulty with Work, Home, Others :   Somewhat difficult   Detailed Depression Screen Score :   2    Total Depression Screen Score :   3    Lavinia Reynoso - 9/19/2020 9:00 AM CDT

## 2022-02-15 NOTE — NURSING NOTE
Comprehensive Intake Entered On:  1/31/2020 9:24 AM CST    Performed On:  1/31/2020 9:20 AM CST by Kellie Sam LPN               Summary   Chief Complaint :   med check. things going well. okay to be seen alone.    Menstrual Status :   Menarcheal   Weight Measured - Metric :   52.3 kg(Converted to: 115 lb 5 oz, 115.302 lb)    Height Measured - Metric :   162.5 cm(Converted to: 5 ft 4 in, 5.33 ft, 1.63 m)    Body Mass Index - Metric :   19.81 kg/m2   BSA - Metric :   1.54 m2   Systolic Blood Pressure :   112 mmHg   Diastolic Blood Pressure :   60 mmHg   Mean Arterial Pressure :   77 mmHg   Peripheral Pulse Rate :   99 bpm   Oxygen Saturation :   99 %   Kellie Sam LPN - 1/31/2020 9:20 AM CST   Health Status   Allergies Verified? :   Yes   Medication History Verified? :   Yes   Immunizations Current :   Yes   Medical History Verified? :   Yes   Pre-Visit Planning Status :   Completed   Tobacco Use? :   Never smoker   Kellie Sam LPN - 1/31/2020 9:20 AM CST   Consents   Consent for Immunization Exchange :   Consent Granted   Consent for Immunizations to Providers :   Consent Granted   Kellie Sam LPN - 1/31/2020 9:20 AM CST   Meds / Allergies   (As Of: 1/31/2020 9:24:40 AM CST)   Allergies (Active)   No Known Medication Allergies  Estimated Onset Date:   Unspecified ; Created By:   Agnieszka Amaral CMA; Reaction Status:   Active ; Category:   Drug ; Substance:   No Known Medication Allergies ; Type:   Allergy ; Updated By:   Agnieszka Amaral CMA; Reviewed Date:   1/31/2020 9:20 AM CST        Medication List   (As Of: 1/31/2020 9:24:40 AM CST)   Prescription/Discharge Order    ethinyl estradiol-norgestimate  :   ethinyl estradiol-norgestimate ; Status:   Prescribed ; Ordered As Mnemonic:   Ortho Tri-Cyclen oral tablet ; Simple Display Line:   1 tab(s), PO, Daily, 84 tab(s), 4 Refill(s) ; Ordering Provider:   Lisa Jaffe MD; Catalog Code:   ethinyl estradiol-norgestimate ; Order Dt/Tm:   5/16/2019 8:45:39 AM  CDT          sertraline  :   sertraline ; Status:   Prescribed ; Ordered As Mnemonic:   Zoloft 50 mg oral tablet ; Simple Display Line:   50 mg, 1 tab(s), PO, Daily, 90 tab(s), 1 Refill(s) ; Ordering Provider:   Lisa Jaffe MD; Catalog Code:   sertraline ; Order Dt/Tm:   10/17/2019 2:15:09 PM CDT            Home Meds    ascorbic acid  :   ascorbic acid ; Status:   Documented ; Ordered As Mnemonic:   Vitamin C 1000 mg oral tablet ; Simple Display Line:   1,000 mg, 1 tab(s), Oral, daily, 0 Refill(s) ; Catalog Code:   ascorbic acid ; Order Dt/Tm:   3/22/2019 11:47:20 AM CDT          cholecalciferol  :   cholecalciferol ; Status:   Documented ; Ordered As Mnemonic:   Vitamin D3 1000 intl units oral capsule ; Simple Display Line:   1,000 International Unit, 1 cap(s), Oral, daily, 0 Refill(s) ; Catalog Code:   cholecalciferol ; Order Dt/Tm:   3/22/2019 11:46:51 AM CDT          multivitamin with minerals  :   multivitamin with minerals ; Status:   Documented ; Ordered As Mnemonic:   multivitamin with minerals (w/ Iron) ; Simple Display Line:   See Instructions, 0 Refill(s) ; Catalog Code:   multivitamin with minerals ; Order Dt/Tm:   3/22/2019 11:47:37 AM CDT          omega-3 polyunsaturated fatty acids  :   omega-3 polyunsaturated fatty acids ; Status:   Documented ; Ordered As Mnemonic:   Fish Oil ; Simple Display Line:   Oral, 0 Refill(s) ; Catalog Code:   omega-3 polyunsaturated fatty acids ; Order Dt/Tm:   3/22/2019 11:47:00 AM CDT

## 2022-02-15 NOTE — NURSING NOTE
Comprehensive Intake Entered On:  3/22/2019 11:49 AM CDT    Performed On:  3/22/2019 11:44 AM CDT by Agnieszka Amaral CMA               Summary   Chief Complaint :   Patient presents for anxiety consult.    Menstrual Status :   Menarcheal   Weight Measured - Metric :   51 kg(Converted to: 112 lb 7 oz, 112.436 lb)    Height Measured - Metric :   162.56 cm(Converted to: 5 ft 4 in, 5.33 ft, 1.63 m)    Body Mass Index - Metric :   19.3 kg/m2   BSA - Metric :   1.52 m2   Systolic Blood Pressure :   100 mmHg   Diastolic Blood Pressure :   60 mmHg   Mean Arterial Pressure :   73 mmHg   Peripheral Pulse Rate :   73 bpm   BP Site :   Right arm   BP Method :   Manual   HR Method :   Electronic   Temperature Tympanic :   97.8 DegF(Converted to: 36.6 DegC)  (LOW)    Oxygen Saturation :   97 %   Agnieszka Amaral CMA - 3/22/2019 11:44 AM CDT   Health Status   Allergies Verified? :   Yes   Medication History Verified? :   Yes   Immunizations Current :   Yes   Pre-Visit Planning Status :   Completed   Tobacco Use? :   Never smoker   Agnieszka Amaral CMA - 3/22/2019 11:44 AM CDT   Consents   Consent for Immunization Exchange :   Consent Granted   Consent for Immunizations to Providers :   Consent Granted   Agnieszka Amaral CMA - 3/22/2019 11:44 AM CDT   Meds / Allergies   (As Of: 3/22/2019 11:49:11 AM CDT)   Allergies (Active)   methylphenidate 24 hour extended release  Estimated Onset Date:   Unspecified ; Reactions:   Tachycardia, Heart palpitations ; Created By:   David Murray CMA; Reaction Status:   Active ; Category:   Drug ; Substance:   methylphenidate 24 hour extended release ; Type:   Allergy ; Updated By:   David Murray CMA; Reviewed Date:   3/22/2019 11:46 AM CDT        Medication List   (As Of: 3/22/2019 11:49:11 AM CDT)   Prescription/Discharge Order    ethinyl estradiol-norgestimate  :   ethinyl estradiol-norgestimate ; Status:   Prescribed ; Ordered As Mnemonic:   Ortho Tri-Cyclen oral tablet ; Simple  Display Line:   1 tab(s), PO, Daily, 28 tab(s), 11 Refill(s) ; Ordering Provider:   Lisa Jaffe MD; Catalog Code:   ethinyl estradiol-norgestimate ; Order Dt/Tm:   1/28/2019 3:17:23 PM            Home Meds    ascorbic acid  :   ascorbic acid ; Status:   Documented ; Ordered As Mnemonic:   Vitamin C 1000 mg oral tablet ; Simple Display Line:   1,000 mg, 1 tab(s), Oral, daily, 0 Refill(s) ; Catalog Code:   ascorbic acid ; Order Dt/Tm:   3/22/2019 11:47:20 AM          cholecalciferol  :   cholecalciferol ; Status:   Documented ; Ordered As Mnemonic:   Vitamin D3 1000 intl units oral capsule ; Simple Display Line:   1,000 International Unit, 1 cap(s), Oral, daily, 0 Refill(s) ; Catalog Code:   cholecalciferol ; Order Dt/Tm:   3/22/2019 11:46:51 AM          Miscellaneous Prescription  :   Miscellaneous Prescription ; Status:   Documented ; Ordered As Mnemonic:   CBD oil ; Simple Display Line:   0 Refill(s) ; Catalog Code:   Miscellaneous Prescription ; Order Dt/Tm:   1/28/2019 2:51:35 PM          multivitamin with minerals  :   multivitamin with minerals ; Status:   Documented ; Ordered As Mnemonic:   multivitamin with minerals (w/ Iron) ; Simple Display Line:   See Instructions, 0 Refill(s) ; Catalog Code:   multivitamin with minerals ; Order Dt/Tm:   3/22/2019 11:47:37 AM          omega-3 polyunsaturated fatty acids  :   omega-3 polyunsaturated fatty acids ; Status:   Documented ; Ordered As Mnemonic:   Fish Oil ; Simple Display Line:   Oral, 0 Refill(s) ; Catalog Code:   omega-3 polyunsaturated fatty acids ; Order Dt/Tm:   3/22/2019 11:47:00 AM

## 2022-02-15 NOTE — TELEPHONE ENCOUNTER
---------------------From: Linda Mccullough CMA To: ARM Message Pool ([address]224_[facility]);   Sent: 5/7/2019 11:21:53 AM CDTSubject: General Message-sertraline refill Phone MessagePCP:   ARM  Time of Call:  1005   Person Calling:  Diego number:  [phone] call at: [phone]:   mom calling stating they are coming for the Holter placement today.  They only have 2 days left of the sertraline and asking for 1wk refill to get them by until they can get in to see ARM next wk.  They recently had a death in the family and that is why they delayed getting in for appt and Holter.  Contacted mom and she will make appt today when she has Alexa with her and her work schedule, will plan on getting in next wk.  [facility] RWLast office visit and reason:  3/22---------------------From: Agnieszka Amaral CMA (ARM Message Pool (32224_[facility])) To: Lisa Jaffe MD;   Sent: 5/7/2019 11:43:36 AM CDTSubject: FW: General Message-sertraline refill PIPE

## 2022-02-15 NOTE — NURSING NOTE
Depression Screening Entered On:  10/24/2019 9:45 AM CDT    Performed On:  10/17/2019 9:40 AM CDT by Lavinia Reynoso               Depression Screening   Little Interest - Pleasure in Activities :   Not at all   Feeling Down, Depressed, Hopeless :   Not at all   Initial Depression Screen Score :   0    Trouble Falling or Staying Asleep :   Not at all   Feeling Tired or Little Energy :   Several days   Poor Appetite or Overeating :   Not at all   Feeling Bad About Yourself :   Several days   Trouble Concentrating :   Not at all   Moving or Speaking Slowly :   Not at all   Thoughts Better Off Dead or Hurting Self :   Not at all   Detailed Depression Screen Score :   2    Total Depression Screen Score :   2    Lavinia Reynoso - 10/24/2019 9:40 AM CDT

## 2022-02-15 NOTE — TELEPHONE ENCOUNTER
---------------------  From: Darby Tran LPN (Phone Messages Pool ([id number]_[facility]))   To: ARM [employer] ([id number]_[place] [facility] [place]);     Sent: 3/12/2019 9:02:33 AM CDT  Subject: Acne/Depression        Phone Message    PCP:   ARM      Time of Call:  0830       Person Calling:  Patients mom   Phone number:  [phone]    Returned call at:     Note:   Mom is calling to let ARM know that the acne on patients chest and back has improved with the BC.  However patient is struggling with anxiety and depression issues now having been taken off her CBD oil.  Mom is wondering if she can start this again or if a ketamine nasal spray would help with depression.  Please advise.     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))   To: Lisa Jaffe MD;     Sent: 3/12/2019 11:46:40 AM CDT  Subject: FW: Acne/Depression---------------------  From: Lisa Jaffe MD   To: [employer] ([address]_[place] [facility] [place]);     Sent: 3/12/2019 12:11:19 PM CDT  Subject: RE: Acne/Depression      I am glad the acne is better.  I don't have an opinion on the CBD oil as it hasn't been studied well enough just yet.  As for the depression symptoms, there certainly are other medications we can try if mom would like.   According to my notes we have only tried fluoxetine in the past.  We had talked about a two month follow up last time- so might be reasonable to recheck the depression and review options in clinic.  Thanks.Called and LM with ARM note.  Left clinics number for them to call back with concerns or to schedule an appt.

## 2022-02-15 NOTE — TELEPHONE ENCOUNTER
---------------------From: Cathy Gilbert RN (Unit 2 Pool (32224_[facility]) ) To: Unit 2 Pool (32224_[facility]) ;   Cc: INR Pool ( 32224_[facility]);    Sent: 5/22/2019 2:12:12 PM CDTSubject: Holter Time of Call:  1330Return call at:_   Person Calling:  Katherine from MokuPhone number:  [phone]  Note:   Holter failed to down load. Please call to redo if needed.Last office visit and reason:  ARM anxietyCalled Mom Lorie, I let her know the Cardio device failed to down load. We discuss the option of a Life Watch ACT monitor or possibly repeating the Cardio Key using a different Cardio Key. Mom will discuss with the patient and call back with a response.

## 2022-02-15 NOTE — PROGRESS NOTES
Patient:   RICARDO VALADEZ            MRN: [id number]            FIN: [id number]               Age:   21 years     Sex:  Female     :  2000   Associated Diagnoses:   Contraception management; Anxiety   Author:   Lisa Jaffe MD      Chief Complaint   2021 8:42 AM CDT    Med check.      History of Present Illness   Chief complaint and symptoms as noted above and confirmed with patient.  Here today for follow-up on her medication.    Last time she was in we increased her Zoloft to 75 mg.  Noticed a significant improvement in her anxiety symptoms since then.  She still does occasionally have some anxiety especially with dealing with coworkers at her job at [employer].  She has been there approximately 8 months and feels they are constantly repeating themselves on what she should be doing.  Overall is tolerating this well.  Sleep is good.  Getting along with her parents.  Still staying at home with them.  Also working part-time at a grocery store.  Not dating anyone.  Making good choices.  Periods are going well with her oral contraceptives.  Gets her second COVID 19 vaccine today.       Review of Systems   All other systems are negative      Health Status   Allergies:    Allergic Reactions (Selected)  No Known Medication Allergies   Medications:  (Selected)   Prescriptions  Prescribed  Ortho Tri-Cyclen oral tablet: 1 tab(s), PO, Daily, # 84 tab(s), 4 Refill(s), Type: Maintenance, Pharmacy: [facility], 1 tab(s) Oral daily, 161.9, cm, 20 15:38:00 CDT, Height Measured - Metric, 50.6, kg, 20 15:38:00 CDT, Weight Measured - Metric  sertraline 50 mg oral tablet: = 1 tab(s), Oral, daily, Instructions: DOSE. SEE DR FOR FUTURE REFILLS., # 14 tab(s), 0 Refill(s), Type: Maintenance, Pharmacy: [facility], 14 days refill per protocol, needs OV for further refills., 1 tab(s) Oral daily,x14 day(s),Instr:DOSE....  Documented Medications  Documented  Fish Oil: Oral, 0 Refill(s), Type:  Maintenance  Vitamin C 1000 mg oral tablet: = 1 tab(s) ( 1,000 mg ), Oral, daily, 0 Refill(s), Type: Maintenance  Vitamin D3 1000 intl units oral capsule: = 1 cap(s) ( 1,000 International Unit ), Oral, daily, 0 Refill(s), Type: Maintenance  multivitamin with minerals (w/ Iron): See Instructions, 0 Refill(s), Type: Maintenance,    Medications          *denotes recorded medication          *Vitamin C 1000 mg oral tablet: 1,000 mg, 1 tab(s), Oral, daily, 0 Refill(s).          *Vitamin D3 1000 intl units oral capsule: 1,000 International Unit, 1 cap(s), Oral, daily, 0 Refill(s).          Ortho Tri-Cyclen oral tablet: 1 tab(s), PO, Daily, 84 tab(s), 4 Refill(s).          *multivitamin with minerals (w/ Iron): See Instructions, 0 Refill(s).          *Fish Oil: Oral, 0 Refill(s).          sertraline 50 mg oral tablet: 1 tab(s), Oral, daily, for 14 day(s), DOSE. SEE DR FOR FUTURE REFILLS., 14 tab(s), 0 Refill(s).       Problem list:    All Problems  ADHD / [id number] / Confirmed      Histories   Past Medical History:    Active  ADHD ([id number])   Family History:    Attention deficit disorder  Father (Manolo)  Anxiety  Grandmother (P)  Hypertension  Father (Manolo)  Grandfather (P)  Migraine  Great Aunt (M)  Heart attack  Grandfather (M)  Hyperlipidemia  Father (Manolo)  Sleep apnea  Father (Manolo)  Skin cancer  Grandmother (M)  Grandmother (P)     Procedure history:    Tear duct surgery in the month of 7/2003 at 20 Months.   Social History:        Electronic Cigarette/Vaping Assessment            Electronic Cigarette Use: Never.      Alcohol Assessment            Never, Use of alcohol by peers: No.      Tobacco Assessment            Never, Use of tobacco by peers: No.            Never (less than 100 in lifetime)      Substance Abuse Assessment            Never, Use of drugs by peers: No.      Home and Environment Assessment            Lives with Father, Mother, 1 older sister.  Living situation: Home/Independent.  Risks in  environment:               Firearm in the household - unknown if locked..        Physical Examination   Vital Signs   4/22/2021 8:42 AM CDT Temperature Tympanic 97.7 DegF  LOW    Peripheral Pulse Rate 87 bpm    HR Method Electronic    Systolic Blood Pressure 102 mmHg    Diastolic Blood Pressure 62 mmHg    Mean Arterial Pressure 75 mmHg    BP Site Right arm    BP Method Manual    Oxygen Saturation 97 %      Measurements from flowsheet : Measurements   4/22/2021 8:42 AM CDT Height Measured - Metric 162.7 cm    Height/Length Percentile 46.41    Height/Length Z-score -0.09    Weight Measured - Metric 52.6 kg    Weight Percentile 27.42    Weight Z-score -0.60    BSA - Metric 1.54 m2    Body Mass Index - Metric 19.87 kg/m2    Body Mass Index Percentile 26.81    BMI Z-score -0.62      Vital signs as noted above   General:  Alert and oriented.    Respiratory:  Lungs clear to auscultation bilaterally.  Equal air entry.  Symmetrical chest expansion.  No wheezing.  .    Cardiovascular:  S1 and S2 with regular rate and rhythm.  No murmurs.  Pulses 2+ in all four extremities.  Brisk capillary refill.  .       Review / Management   STAR-7: 4 total, not difficult  PHQ 9: 1/27.        Impression and Plan   Diagnosis     Contraception management (NJK80-SS Z30.9).     Anxiety (OSJ02-RM F41.9).     Plan:  We will continue Zoloft 75 mg.  Since she has been steady for so long will give her a 1 year refill.  Instructed that if she had any increase in symptoms or concerns she should return to clinic sooner.  We will also provide a refill on her oral contraceptives.  RTC 1 year, sooner if needed. .    Orders     Orders (Selected)   Prescriptions  Prescribed  Ortho Tri-Cyclen oral tablet: 1 tab(s), PO, Daily, # 84 tab(s), 4 Refill(s), Type: Maintenance, Pharmacy: [facility] [id number] [facility], 1 tab(s) Oral daily, 162.7, cm, 04/22/21 8:42:00 CDT, Height Measured - Metric, 52.6, kg, 04/22/21 8:42:00 CDT, Weight Measured - Metric  sertraline 25 mg oral  tablet: = 1 tab(s), Oral, daily, Instructions: INSTR:TAKE W/ 50MG TABS TO GET 75MG TOTAL., # 90 tab(s), 3 Refill(s), Type: Maintenance, Pharmacy: [facility] [id number] [facility], 1 tab(s) Oral daily,x90 day(s),Instr:INSTR:TAKE W/ 50MG TABS TO GET 75MG TOTAL., 162.7, cm,...  sertraline 50 mg oral tablet: = 1 tab(s), Oral, daily, Instructions: TAKE W/ 25MG TABS TO GET 75MG TOTAL., # 90 tab(s), 3 Refill(s), Type: Maintenance, Pharmacy: [facility] [id number] [facility], 1 tab(s) Oral daily,x90 day(s),Instr:TAKE W/ 25MG TABS TO GET 75MG TOTAL., 162.7, cm, 04/22/21 8:4....

## 2022-02-15 NOTE — NURSING NOTE
Comprehensive Intake Entered On:  1/28/2019 2:54 PM CST    Performed On:  1/28/2019 2:44 PM CST by Elza Medina               Summary   Chief Complaint :   Pt here for 19yr well child   Menstrual Status :   Menarcheal   Weight Measured - Metric :   52.3 kg(Converted to: 115 lb 5 oz, 115.302 lb)    Height Measured - Metric :   162.5 cm(Converted to: 5 ft 4 in, 5.33 ft, 1.63 m)    Body Mass Index - Metric :   19.81 kg/m2   BSA - Metric :   1.54 m2   Systolic Blood Pressure :   112 mmHg   Diastolic Blood Pressure :   70 mmHg   Mean Arterial Pressure :   84 mmHg   Peripheral Pulse Rate :   64 bpm   BP Site :   Right arm   Pulse Site :   Radial artery   BP Method :   Manual   HR Method :   Manual   Temperature Tympanic :   98.7 DegF(Converted to: 37.1 DegC)    Elza Medina - 1/28/2019 2:44 PM CST   Health Status   Allergies Verified? :   Yes   Medication History Verified? :   Yes   Immunizations Current :   Yes   Medical History Verified? :   Yes   Pre-Visit Planning Status :   Completed   Well Child Visit? :   Yes   Tobacco Use? :   Never smoker   Elza Medina - 1/28/2019 2:44 PM CST   Consents   Consent for Immunization Exchange :   Consent Granted   Consent for Immunizations to Providers :   Consent Granted   Elza Medina - 1/28/2019 2:44 PM CST   Meds / Allergies   (As Of: 1/28/2019 2:54:57 PM CST)   Allergies (Active)   methylphenidate 24 hour extended release  Estimated Onset Date:   Unspecified ; Reactions:   Tachycardia, Heart palpitations ; Created By:   David Murray CMA; Reaction Status:   Active ; Category:   Drug ; Substance:   methylphenidate 24 hour extended release ; Type:   Allergy ; Updated By:   David Murray CMA; Reviewed Date:   1/28/2019 2:50 PM CST        Medication List   (As Of: 1/28/2019 2:54:57 PM CST)   Prescription/Discharge Order    FLUoxetine  :   FLUoxetine ; Status:   Completed ; Ordered As Mnemonic:   FLUoxetine 10 mg oral capsule ; Simple Display Line:   10  mg, 1 cap(s), po, daily, 30 cap(s), 6 Refill(s) ; Ordering Provider:   Lisa Jaffe MD; Catalog Code:   FLUoxetine ; Order Dt/Tm:   2/20/2018 3:50:12 PM          adapalene topical  :   adapalene topical ; Status:   Completed ; Ordered As Mnemonic:   Differin 0.1% topical cream ; Simple Display Line:   1 vicente, TOP, Once a day (at bedtime), 45 g, 3 Refill(s) ; Ordering Provider:   Lisa Jaffe MD; Catalog Code:   adapalene topical ; Order Dt/Tm:   2/20/2018 3:43:02 PM            Home Meds    Miscellaneous Prescription  :   Miscellaneous Prescription ; Status:   Documented ; Ordered As Mnemonic:   CBD oil ; Simple Display Line:   0 Refill(s) ; Catalog Code:   Miscellaneous Prescription ; Order Dt/Tm:   1/28/2019 2:51:35 PM          omega-3 polyunsaturated fatty acids  :   omega-3 polyunsaturated fatty acids ; Status:   Completed ; Ordered As Mnemonic:   Omega-3 oral capsule ; Simple Display Line:   0 Refill(s) ; Catalog Code:   omega-3 polyunsaturated fatty acids ; Order Dt/Tm:   7/5/2017 11:06:49 AM          multivitamin with iron  :   multivitamin with iron ; Status:   Completed ; Ordered As Mnemonic:   Iron-150 oral tablet ; Simple Display Line:   1 tab(s), po, daily, 0 Refill(s) ; Catalog Code:   multivitamin with iron ; Order Dt/Tm:   1/18/2016 10:31:02 AM            Vision Testing POC   Corrective Lenses :   Glasses   Eye, Left with Correction Visual Acuity :   20/30   Eye, Right with Correction Visual Acuity :   20/30   Eye, Bilat w/Correction Visual Acuity :   20/25   Elza Medina - 1/28/2019 2:44 PM CST

## 2022-02-15 NOTE — NURSING NOTE
Depression Screening Entered On:  4/26/2021 11:43 AM CDT    Performed On:  4/22/2021 11:43 AM CDT by Lavinia Reynoso               Depression Screening   Little Interest - Pleasure in Activities :   Not at all   Feeling Down, Depressed, Hopeless :   Several days   Initial Depression Screen Score :   1 Score   Poor Appetite or Overeating :   Not at all   Trouble Falling or Staying Asleep :   Not at all   Feeling Tired or Little Energy :   Not at all   Feeling Bad About Yourself :   Not at all   Trouble Concentrating :   Not at all   Moving or Speaking Slowly :   Not at all   Thoughts Better Off Dead or Hurting Self :   Not at all   Difficulty at Work, Home, Getting Along :   Not difficult at all   Detailed Depression Screen Score :   0    Total Depression Screen Score :   1    Lavinia Reynoso - 4/26/2021 11:43 AM CDT

## 2022-02-15 NOTE — NURSING NOTE
Generalized Anxiety Disorder Screening Entered On:  2/4/2020 10:14 AM CST    Performed On:  1/31/2020 10:13 AM CST by Lavinia Reynoso               Generalized Anxiety Disorder Screening   STAR Nervous, Anxious On Edge :   Not at all   STAR Control Worrying B :   Several days   STAR Worrying Too Much :   Several days   STAR Restless :   Not at all   STAR Easily Annoyed/Irritable :   Not at all   STAR Afraid :   Not at all   STAR Trouble Relaxing :   Not at all   STAR Total Screening Score :   2    STAR Difficulty with Work, Home, Others :   Not difficult at all   Lavinia Reynoso - 2/4/2020 10:13 AM CST

## 2022-02-15 NOTE — NURSING NOTE
Depression Screening Entered On:  3/26/2019 9:29 AM CDT    Performed On:  3/22/2019 9:28 AM CDT by Iliana Frank MA               Depression Screening   Feeling Down, Depressed, Hopeless :   More than half the days   Little Interest - Pleasure in Activities :   Not at all   Initial Depression Screen Score :   2    Trouble Falling or Staying Asleep :   Not at all   Feeling Tired or Little Energy :   Not at all   Poor Appetite or Overeating :   Not at all   Feeling Bad About Yourself :   Several days   Trouble Concentrating :   Not at all   Moving or Speaking Slowly :   Not at all   Thoughts Better Off Dead or Hurting Self :   Several days   Detailed Depression Screen Score :   2    Total Depression Screen Score :   4    Iliana Frank MA - 3/26/2019 9:28 AM CDT

## 2022-02-15 NOTE — NURSING NOTE
Generalized Anxiety Disorder Screening Entered On:  5/22/2019 12:03 PM CDT    Performed On:  5/16/2019 12:03 PM CDT by Josette Lehman CMA               Generalized Anxiety Disorder Screening   STAR Nervous, Anxious On Edge :   Several days   STAR Control Worrying B :   Several days   STAR Worrying Too Much :   Several days   STAR Restless :   Not at all   STAR Easily Annoyed/Irritable :   Not at all   STAR Afraid :   Several days   STAR Trouble Relaxing :   Not at all   STAR Total Screening Score :   4    STAR Difficulty with Work, Home, Others :   Not difficult at all   Josette Lehman CMA - 5/22/2019 12:03 PM CDT

## 2022-02-15 NOTE — NURSING NOTE
Generalized Anxiety Disorder Screening Entered On:  9/19/2020 9:16 AM CDT    Performed On:  9/19/2020 9:16 AM CDT by Lavinia Reynoso               Generalized Anxiety Disorder Screening   STAR Nervous, Anxious On Edge :   Several days   STAR Control Worrying B :   Several days   STAR Worrying Too Much :   Several days   STAR Trouble Relaxing :   Several days   STAR Restless :   Not at all   STAR Easily Annoyed/Irritable :   Not at all   STAR Afraid :   Several days   STAR Total Screening Score :   5    Lavinia Reynoso - 9/19/2020 9:16 AM CDT

## 2022-02-15 NOTE — LETTER
(Inserted Image. Unable to display)     May 06, 2019      RICARDO VALADEZ   [address] [place], [place] [id number]          Dear RICARDO,      Thank you for selecting [facility] ([facility]viously [facility] for your healthcare needs.      Our records indicate you are due for the following services:     Follow-up office visit / Medication Check.      To schedule an appointment or if you have further questions, please contact your primary clinic:   [facility]       [phone]   [facility]       [phone]              [facility]     [phone]      Powered by [employer]    Sincerely,    Lisa Jaffe MD

## 2022-04-03 ENCOUNTER — HEALTH MAINTENANCE LETTER (OUTPATIENT)
Age: 22
End: 2022-04-03

## 2022-04-05 ENCOUNTER — OFFICE VISIT (OUTPATIENT)
Dept: FAMILY MEDICINE | Facility: CLINIC | Age: 22
End: 2022-04-05
Payer: COMMERCIAL

## 2022-04-05 VITALS
HEART RATE: 66 BPM | OXYGEN SATURATION: 99 % | DIASTOLIC BLOOD PRESSURE: 60 MMHG | SYSTOLIC BLOOD PRESSURE: 118 MMHG | HEIGHT: 64 IN | WEIGHT: 112.88 LBS | BODY MASS INDEX: 19.27 KG/M2

## 2022-04-05 DIAGNOSIS — Z30.41 ENCOUNTER FOR SURVEILLANCE OF CONTRACEPTIVE PILLS: ICD-10-CM

## 2022-04-05 DIAGNOSIS — F41.9 ANXIETY: Primary | ICD-10-CM

## 2022-04-05 PROCEDURE — 99214 OFFICE O/P EST MOD 30 MIN: CPT | Performed by: PEDIATRICS

## 2022-04-05 PROCEDURE — 96127 BRIEF EMOTIONAL/BEHAV ASSMT: CPT | Performed by: PEDIATRICS

## 2022-04-05 RX ORDER — SERTRALINE HYDROCHLORIDE 100 MG/1
100 TABLET, FILM COATED ORAL DAILY
Qty: 30 TABLET | Refills: 2 | Status: SHIPPED | OUTPATIENT
Start: 2022-04-05 | End: 2022-05-02

## 2022-04-05 RX ORDER — SERTRALINE HYDROCHLORIDE 25 MG/1
TABLET, FILM COATED ORAL
COMMUNITY
Start: 2021-04-22 | End: 2022-04-05

## 2022-04-05 RX ORDER — NORGESTIMATE AND ETHINYL ESTRADIOL 7DAYSX3 28
1 KIT ORAL DAILY
COMMUNITY
Start: 2022-02-03 | End: 2022-04-05

## 2022-04-05 RX ORDER — ERYTHROMYCIN 5 MG/G
OINTMENT OPHTHALMIC
COMMUNITY
Start: 2021-04-28 | End: 2022-04-05

## 2022-04-05 RX ORDER — NORGESTIMATE AND ETHINYL ESTRADIOL 7DAYSX3 28
1 KIT ORAL DAILY
Qty: 84 TABLET | Refills: 3 | Status: SHIPPED | OUTPATIENT
Start: 2022-04-05 | End: 2022-09-01

## 2022-04-05 ASSESSMENT — PATIENT HEALTH QUESTIONNAIRE - PHQ9
SUM OF ALL RESPONSES TO PHQ QUESTIONS 1-9: 3
10. IF YOU CHECKED OFF ANY PROBLEMS, HOW DIFFICULT HAVE THESE PROBLEMS MADE IT FOR YOU TO DO YOUR WORK, TAKE CARE OF THINGS AT HOME, OR GET ALONG WITH OTHER PEOPLE: SOMEWHAT DIFFICULT
SUM OF ALL RESPONSES TO PHQ QUESTIONS 1-9: 3

## 2022-04-05 ASSESSMENT — ANXIETY QUESTIONNAIRES
4. TROUBLE RELAXING: SEVERAL DAYS
GAD7 TOTAL SCORE: 6
3. WORRYING TOO MUCH ABOUT DIFFERENT THINGS: SEVERAL DAYS
GAD7 TOTAL SCORE: 6
7. FEELING AFRAID AS IF SOMETHING AWFUL MIGHT HAPPEN: NOT AT ALL
7. FEELING AFRAID AS IF SOMETHING AWFUL MIGHT HAPPEN: NOT AT ALL
5. BEING SO RESTLESS THAT IT IS HARD TO SIT STILL: NOT AT ALL
GAD7 TOTAL SCORE: 6
1. FEELING NERVOUS, ANXIOUS, OR ON EDGE: MORE THAN HALF THE DAYS
2. NOT BEING ABLE TO STOP OR CONTROL WORRYING: SEVERAL DAYS
6. BECOMING EASILY ANNOYED OR IRRITABLE: SEVERAL DAYS

## 2022-04-05 NOTE — PROGRESS NOTES
"    Assessment & Plan     (F41.9) Anxiety  (primary encounter diagnosis)    (Z30.41) Encounter for surveillance of contraceptive pills    Plan:  We will increase her Zoloft to 100 mg once daily.  Offered cognitive behavioral therapy.  She will reach back out to us if this is something she is interested in.  Continue current oral contraceptives.  Deferred Chlamydia testing as she has not been sexually active in lifetime.  Return to clinic in 2 months for recheck, sooner if needed.    Lisa Jaffe MD  [facility]    Poppy Liu is a 22 year old who presents for the following health issues   Here today for follow-up on medications.  States that overall she has had increased anxiety at work.  Has been getting down on herself more often.  Feels sad when her anxiety is increased.  Denies self-harm thoughts.  Things are going okay with her parents and socially.  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  Enjoys her jobs.  Denies any issues with sleep.    Has forgotten to take a oral contraceptive pill and now has had some spotting.  Otherwise she likes her oral contraceptive and would like to stay on.  She is not sexually active.    Answers for HPI/ROS submitted by the patient on 4/5/2022  If you checked off any problems, how difficult have these problems made it for you to do your work, take care of things at home, or get along with other people?: Somewhat difficult  PHQ9 TOTAL SCORE: 3  STAR 7 TOTAL SCORE: 6          Objective    /60   Pulse 66   Ht 1.622 m (5' 3.86\")   Wt 51.2 kg (112 lb 14 oz)   LMP 04/05/2022 (Within Days)   SpO2 99%   Breastfeeding Yes   BMI 19.46 kg/m    Body mass index is 19.46 kg/m .     Physical Exam   General:  Alert and oriented, No acute distress.        Respiratory:  Lungs clear to auscultation bilaterally.  Equal air entry.  " Symmetrical chest expansion.  No wheezing.    Cardiovascular:  S1 and S2 with regular rate and rhythm.  No murmurs.  Pulses 2+ in all four extremities.  Brisk capillary refill.     Neurologic:  No focal deficits.  Psych: Bright affect, good eye contact.

## 2022-04-06 ASSESSMENT — ANXIETY QUESTIONNAIRES: GAD7 TOTAL SCORE: 6

## 2022-04-06 ASSESSMENT — PATIENT HEALTH QUESTIONNAIRE - PHQ9: SUM OF ALL RESPONSES TO PHQ QUESTIONS 1-9: 3

## 2022-04-30 DIAGNOSIS — F41.9 ANXIETY: ICD-10-CM

## 2022-05-02 RX ORDER — SERTRALINE HYDROCHLORIDE 100 MG/1
TABLET, FILM COATED ORAL
Qty: 90 TABLET | Refills: 1 | Status: SHIPPED | OUTPATIENT
Start: 2022-05-02 | End: 2022-09-01

## 2022-09-01 ENCOUNTER — OFFICE VISIT (OUTPATIENT)
Dept: FAMILY MEDICINE | Facility: CLINIC | Age: 22
End: 2022-09-01
Payer: COMMERCIAL

## 2022-09-01 VITALS
SYSTOLIC BLOOD PRESSURE: 102 MMHG | OXYGEN SATURATION: 98 % | HEIGHT: 64 IN | DIASTOLIC BLOOD PRESSURE: 60 MMHG | WEIGHT: 110.67 LBS | HEART RATE: 76 BPM | BODY MASS INDEX: 18.89 KG/M2

## 2022-09-01 DIAGNOSIS — Z00.00 WELL ADULT EXAM: Primary | ICD-10-CM

## 2022-09-01 DIAGNOSIS — Z30.41 ENCOUNTER FOR SURVEILLANCE OF CONTRACEPTIVE PILLS: ICD-10-CM

## 2022-09-01 DIAGNOSIS — F41.9 ANXIETY: ICD-10-CM

## 2022-09-01 PROCEDURE — 99395 PREV VISIT EST AGE 18-39: CPT | Performed by: PEDIATRICS

## 2022-09-01 RX ORDER — SERTRALINE HYDROCHLORIDE 100 MG/1
100 TABLET, FILM COATED ORAL DAILY
Qty: 90 TABLET | Refills: 1 | Status: SHIPPED | OUTPATIENT
Start: 2022-09-01 | End: 2023-05-05

## 2022-09-01 RX ORDER — NORGESTIMATE AND ETHINYL ESTRADIOL 7DAYSX3 28
1 KIT ORAL DAILY
Qty: 84 TABLET | Refills: 3 | Status: SHIPPED | OUTPATIENT
Start: 2022-09-01 | End: 2023-05-05

## 2022-09-01 ASSESSMENT — ENCOUNTER SYMPTOMS
WEAKNESS: 0
DYSURIA: 0
FREQUENCY: 0
HEMATURIA: 0
EYE PAIN: 0
JOINT SWELLING: 0
MYALGIAS: 0
NERVOUS/ANXIOUS: 0
CHILLS: 0
ARTHRALGIAS: 0
NAUSEA: 0
DIARRHEA: 0
ABDOMINAL PAIN: 0
PARESTHESIAS: 0
HEMATOCHEZIA: 0
HEARTBURN: 0
COUGH: 0
HEADACHES: 0
SORE THROAT: 0
DIZZINESS: 0
CONSTIPATION: 0
FEVER: 0
SHORTNESS OF BREATH: 0
PALPITATIONS: 0
BREAST MASS: 0

## 2022-09-01 NOTE — PROGRESS NOTES
"   SUBJECTIVE:   CC: Alexa Nguyen is an 22 year old woman who presents for preventive health visit.           Healthy Habits:     Getting at least 3 servings of Calcium per day:  Yes    Bi-annual eye exam:  Yes    Dental care twice a year:  Yes    Sleep apnea or symptoms of sleep apnea:  None    Diet:  Regular (no restrictions) and Other    Frequency of exercise:  2-3 days/week    Duration of exercise:  15-30 minutes    Taking medications regularly:  Yes    Medication side effects:  Other    PHQ-2 Total Score: 1    Additional concerns today:  No    Is working at [employer] as a .  This is going well.        Today's PHQ-2 Score:   PHQ-2 ( 1999 Pf[employer]) 9/1/2022   Q1: Little interest or pleasure in doing things 0   Q2: Feeling down, depressed or hopeless 1   PHQ-2 Score 1   Q1: Little interest or pleasure in doing things Not at all   Q2: Feeling down, depressed or hopeless Several days   PHQ-2 Score 1   Moods are good.  Happy on current medication.    Abuse: Current or Past (Physical, Sexual or Emotional) - No  Do you feel safe in your environment? Yes        Social History     Tobacco Use     Smoking status: Never Smoker     Smokeless tobacco: Never Used   Substance Use Topics     Alcohol use: Never         Alcohol Use 9/1/2022   Prescreen: >3 drinks/day or >7 drinks/week? Not Applicable             History of abnormal Pap smear: NO - under age 21, PAP not appropriate for age     Reviewed and updated as needed this visit by clinical staff   Tobacco  Allergies  Meds                Reviewed and updated as needed this visit by Provider                       Review of Systems  Positive for night sweats.  No change in appetite, fevers.  Very occasional.  Otherwise review of systems negative x7 systems.     OBJECTIVE:   /60   Pulse 76   Ht 1.633 m (5' 4.29\")   Wt 50.2 kg (110 lb 10.7 oz)   LMP 08/28/2022 (Within Days)   SpO2 98%   BMI 18.82 kg/m       Physical Exam  GENERAL: healthy, alert " "and no distress  EYES: Eyes grossly normal to inspection, PERRL and conjunctivae and sclerae normal  HENT: ear canals and TM's normal, nose and mouth without ulcers or lesions  NECK: no adenopathy, no asymmetry, masses, or scars and thyroid normal to palpation  RESP: lungs clear to auscultation - no rales, rhonchi or wheezes  BREAST: normal without masses, tenderness or nipple discharge and no palpable axillary masses or adenopathy  CV: regular rate and rhythm, normal S1 S2, no S3 or S4, no murmur, click or rub, no peripheral edema and peripheral pulses strong  ABDOMEN: soft, nontender, no hepatosplenomegaly, no masses and bowel sounds normal  MS: no gross musculoskeletal defects noted, no edema  SKIN: no suspicious lesions or rashes  NEURO: Normal strength and tone, mentation intact and speech normal  PSYCH: mentation appears normal, affect normal/bright    Diagnostic Test Results:  none     ASSESSMENT/PLAN:       ICD-10-CM    1. Well adult exam  Z00.00    2. Anxiety  F41.9 sertraline (ZOLOFT) 100 MG tablet   3. Encounter for surveillance of contraceptive pills  Z30.41 TRI FEMYNOR 0.18/0.215/0.25 MG-35 MCG tablet     Recommend flu vaccine this fall      COUNSELING:  Reviewed preventive health counseling, as reflected in patient instructions    Estimated body mass index is 18.82 kg/m  as calculated from the following:    Height as of this encounter: 1.633 m (5' 4.29\").    Weight as of this encounter: 50.2 kg (110 lb 10.7 oz).  q      She reports that she has never smoked. She has never used smokeless tobacco.      Counseling Resources:  ATP IV Guidelines  Pooled Cohorts Equation Calculator  Breast Cancer Risk Calculator  BRCA-Related Cancer Risk Assessment: FHS-7 Tool  FRAX Risk Assessment  ICSI Preventive Guidelines  Dietary Guidelines for Americans, 2010  USDA's MyPlate  ASA Prophylaxis  Lung CA Screening    Lisa Jaffe MD  [facility]  "

## 2022-09-28 ENCOUNTER — E-VISIT (OUTPATIENT)
Dept: FAMILY MEDICINE | Facility: CLINIC | Age: 22
End: 2022-09-28
Payer: COMMERCIAL

## 2022-09-28 DIAGNOSIS — R61 NIGHT SWEATS: Primary | ICD-10-CM

## 2022-09-28 PROBLEM — F41.9 ANXIETY: Status: ACTIVE | Noted: 2022-09-28

## 2022-09-28 PROCEDURE — 99422 OL DIG E/M SVC 11-20 MIN: CPT | Performed by: PEDIATRICS

## 2022-09-28 NOTE — PATIENT INSTRUCTIONS
Thank you for choosing us for your care. Given your symptoms, I would like you to do a lab-only visit to determine what is causing them.  I have placed the orders.  Please schedule an appointment with the lab right here in [facility], or call [phone].  I will let you know when the results are back and next steps to take.  Thank you for choosing us for your care. I think an in-clinic visit would be best next steps based on your symptoms. Please schedule a clinic appointment; you won t be charged for this eVisit.      You can schedule an appointment right here in [facility], or call [phone]

## 2022-09-28 NOTE — TELEPHONE ENCOUNTER
Patient reports night sweats for about a week.  Wakes up and has to change her clothes.  Has tested for COVID and this has been negative.  No other associated symptoms reported other than she cannot sleep due to symptoms.    She would benefit from CBC with differential, sed rate, chest x-ray and a in person physical exam.    Lisa Jaffe MD on 9/28/2022 at 9:31 AM      Provider E-Visit time total (minutes): 12 minutes

## 2022-09-30 ENCOUNTER — LAB (OUTPATIENT)
Dept: LAB | Facility: CLINIC | Age: 22
End: 2022-09-30

## 2022-09-30 ENCOUNTER — ANCILLARY PROCEDURE (OUTPATIENT)
Dept: GENERAL RADIOLOGY | Facility: CLINIC | Age: 22
End: 2022-09-30
Payer: COMMERCIAL

## 2022-09-30 DIAGNOSIS — Z11.3 SCREENING FOR STDS (SEXUALLY TRANSMITTED DISEASES): ICD-10-CM

## 2022-09-30 DIAGNOSIS — R61 NIGHT SWEATS: ICD-10-CM

## 2022-09-30 DIAGNOSIS — Z11.59 NEED FOR HEPATITIS C SCREENING TEST: ICD-10-CM

## 2022-09-30 LAB
BASOPHILS # BLD AUTO: 0 10E3/UL (ref 0–0.2)
BASOPHILS NFR BLD AUTO: 1 %
EOSINOPHIL # BLD AUTO: 0 10E3/UL (ref 0–0.7)
EOSINOPHIL NFR BLD AUTO: 1 %
ERYTHROCYTE [DISTWIDTH] IN BLOOD BY AUTOMATED COUNT: 12.2 % (ref 10–15)
ERYTHROCYTE [SEDIMENTATION RATE] IN BLOOD BY WESTERGREN METHOD: 6 MM/HR (ref 0–20)
HCT VFR BLD AUTO: 47.4 % (ref 35–47)
HCV AB SERPL QL IA: NONREACTIVE
HGB BLD-MCNC: 15.2 G/DL (ref 11.7–15.7)
HIV 1+2 AB+HIV1 P24 AG SERPL QL IA: NONREACTIVE
IMM GRANULOCYTES # BLD: 0 10E3/UL
IMM GRANULOCYTES NFR BLD: 0 %
LYMPHOCYTES # BLD AUTO: 1.4 10E3/UL (ref 0.8–5.3)
LYMPHOCYTES NFR BLD AUTO: 42 %
MCH RBC QN AUTO: 29.2 PG (ref 26.5–33)
MCHC RBC AUTO-ENTMCNC: 32.1 G/DL (ref 31.5–36.5)
MCV RBC AUTO: 91 FL (ref 78–100)
MONOCYTES # BLD AUTO: 0.3 10E3/UL (ref 0–1.3)
MONOCYTES NFR BLD AUTO: 10 %
NEUTROPHILS # BLD AUTO: 1.6 10E3/UL (ref 1.6–8.3)
NEUTROPHILS NFR BLD AUTO: 46 %
PLATELET # BLD AUTO: 101 10E3/UL (ref 150–450)
RBC # BLD AUTO: 5.2 10E6/UL (ref 3.8–5.2)
TSH SERPL DL<=0.005 MIU/L-ACNC: 1.35 UIU/ML (ref 0.3–4.2)
WBC # BLD AUTO: 3.3 10E3/UL (ref 4–11)

## 2022-09-30 PROCEDURE — 36415 COLL VENOUS BLD VENIPUNCTURE: CPT

## 2022-09-30 PROCEDURE — 71046 X-RAY EXAM CHEST 2 VIEWS: CPT | Mod: TC | Performed by: RADIOLOGY

## 2022-09-30 PROCEDURE — 87491 CHLMYD TRACH DNA AMP PROBE: CPT

## 2022-09-30 PROCEDURE — 84443 ASSAY THYROID STIM HORMONE: CPT

## 2022-09-30 PROCEDURE — 87389 HIV-1 AG W/HIV-1&-2 AB AG IA: CPT

## 2022-09-30 PROCEDURE — 87591 N.GONORRHOEAE DNA AMP PROB: CPT

## 2022-09-30 PROCEDURE — 86803 HEPATITIS C AB TEST: CPT

## 2022-09-30 PROCEDURE — 85652 RBC SED RATE AUTOMATED: CPT

## 2022-09-30 PROCEDURE — 85025 COMPLETE CBC W/AUTO DIFF WBC: CPT | Mod: QW

## 2022-10-01 LAB
C TRACH DNA SPEC QL NAA+PROBE: NEGATIVE
N GONORRHOEA DNA SPEC QL NAA+PROBE: NEGATIVE

## 2022-10-03 ENCOUNTER — HEALTH MAINTENANCE LETTER (OUTPATIENT)
Age: 22
End: 2022-10-03

## 2022-10-03 ENCOUNTER — OFFICE VISIT (OUTPATIENT)
Dept: FAMILY MEDICINE | Facility: CLINIC | Age: 22
End: 2022-10-03
Payer: COMMERCIAL

## 2022-10-03 VITALS — OXYGEN SATURATION: 99 % | HEART RATE: 77 BPM | WEIGHT: 112.8 LBS | BODY MASS INDEX: 19.19 KG/M2

## 2022-10-03 DIAGNOSIS — R89.9 ABNORMAL LABORATORY TEST: ICD-10-CM

## 2022-10-03 DIAGNOSIS — R61 NIGHT SWEATS: Primary | ICD-10-CM

## 2022-10-03 LAB — MONOCYTES NFR BLD AUTO: POSITIVE %

## 2022-10-03 PROCEDURE — 86665 EPSTEIN-BARR CAPSID VCA: CPT | Mod: 59 | Performed by: PEDIATRICS

## 2022-10-03 PROCEDURE — 86308 HETEROPHILE ANTIBODY SCREEN: CPT | Mod: QW | Performed by: PEDIATRICS

## 2022-10-03 PROCEDURE — 86666 EHRLICHIA ANTIBODY: CPT | Mod: 90 | Performed by: PEDIATRICS

## 2022-10-03 PROCEDURE — 85025 COMPLETE CBC W/AUTO DIFF WBC: CPT | Mod: QW | Performed by: PEDIATRICS

## 2022-10-03 PROCEDURE — 80053 COMPREHEN METABOLIC PANEL: CPT | Performed by: PEDIATRICS

## 2022-10-03 PROCEDURE — 86645 CMV ANTIBODY IGM: CPT | Performed by: PEDIATRICS

## 2022-10-03 PROCEDURE — 86665 EPSTEIN-BARR CAPSID VCA: CPT | Performed by: PEDIATRICS

## 2022-10-03 PROCEDURE — 36415 COLL VENOUS BLD VENIPUNCTURE: CPT | Performed by: PEDIATRICS

## 2022-10-03 PROCEDURE — 99215 OFFICE O/P EST HI 40 MIN: CPT | Performed by: PEDIATRICS

## 2022-10-03 PROCEDURE — 86644 CMV ANTIBODY: CPT | Performed by: PEDIATRICS

## 2022-10-03 PROCEDURE — 99000 SPECIMEN HANDLING OFFICE-LAB: CPT | Performed by: PEDIATRICS

## 2022-10-03 PROCEDURE — 86618 LYME DISEASE ANTIBODY: CPT | Performed by: PEDIATRICS

## 2022-10-03 ASSESSMENT — ENCOUNTER SYMPTOMS
MUSCULOSKELETAL NEGATIVE: 1
RESPIRATORY NEGATIVE: 1
CARDIOVASCULAR NEGATIVE: 1
GASTROINTESTINAL NEGATIVE: 1
NEUROLOGICAL NEGATIVE: 1
ALLERGIC/IMMUNOLOGIC NEGATIVE: 1
PSYCHIATRIC NEGATIVE: 1
HEMATOLOGIC/LYMPHATIC NEGATIVE: 1
ENDOCRINE NEGATIVE: 1
EYES NEGATIVE: 1
CONSTITUTIONAL NEGATIVE: 1

## 2022-10-03 NOTE — LETTER
October 3, 2022      Alexa Nguyen   [address] [place] [id number]        To Whom It May Concern:    Alexa Nguyen  was seen on 10/3/22.  Please excuse her until fatigue has improved.       Sincerely,        Lisa Jaffe MD

## 2022-10-03 NOTE — PROGRESS NOTES
Assessment & Plan     Night sweats    - CBC with platelets and differential; Future  - Comprehensive metabolic panel (BMP + Alb, Alk Phos, ALT, AST, Total. Bili, TP); Future  - EBV Capsid Antibody IgM; Future  - EBV Capsid Antibody IgG; Future  - Mononucleosis screen; Future  - CMV Antibody IgG; Future  - CMV antibody IgM; Future  - Anaplasma phagocytoph antibody IgG IgM; Future  - Lyme Disease Total Abs Bld with Reflex to Confirm CLIA; Future  - Quantiferon TB Gold Plus; Future  - CBC with platelets and differential  - Comprehensive metabolic panel (BMP + Alb, Alk Phos, ALT, AST, Total. Bili, TP)  - EBV Capsid Antibody IgM  - EBV Capsid Antibody IgG  - Mononucleosis screen  - CMV Antibody IgG  - CMV antibody IgM  - Anaplasma phagocytoph antibody IgG IgM  - Lyme Disease Total Abs Bld with Reflex to Confirm CLIA  - Quantiferon TB Gold Plus    Abnormal laboratory test    - CBC with platelets and differential; Future  - Comprehensive metabolic panel (BMP + Alb, Alk Phos, ALT, AST, Total. Bili, TP); Future  - EBV Capsid Antibody IgM; Future  - EBV Capsid Antibody IgG; Future  - Mononucleosis screen; Future  - CMV Antibody IgG; Future  - CMV antibody IgM; Future  - Anaplasma phagocytoph antibody IgG IgM; Future  - Lyme Disease Total Abs Bld with Reflex to Confirm CLIA; Future  - CBC with platelets and differential  - Comprehensive metabolic panel (BMP + Alb, Alk Phos, ALT, AST, Total. Bili, TP)  - EBV Capsid Antibody IgM  - EBV Capsid Antibody IgG  - Mononucleosis screen  - CMV Antibody IgG  - CMV antibody IgM  - Anaplasma phagocytoph antibody IgG IgM  - Lyme Disease Total Abs Bld with Reflex to Confirm CLIA      Plan:    Will repeat a CBC to see if there is further change in her blood counts tonight.  Serology for CMV, EBV, ehrlichiosis, Western blot for Lyme.  Will also check QuantiFERON gold to rule out TB although her chest x-ray was read as normal.  Continue supportive cares.  If this is viral, will need to  monitor her clinically.  If she has further drops in her counts will need to consider hematology consult, would treat Ehrlichia or Lyme if positive.  Plan to discuss lab results via phone at [phone] as they become available.  Discussed warning signs with mom of when to return to clinic or ER sooner.    Lisa Jaffe MD on 10/3/2022 at 6:24 PM      For billing purposes only: I spent 50 minutes on the date of the encounter during chart review, history and exam, documentation and further activities as noted above.    Poppy Liu is a 22 year old accompanied by her mother, presenting for the following health issues:  RECHECK      History of Present Illness       Reason for visit:  Night Sweats  Symptom onset:  1-2 weeks ago  Symptoms include:  Extra Sleepy Take more naps  Symptom intensity:  Moderate  Symptom progression:  Staying the same  Had these symptoms before:  No    She eats 0-1 servings of fruits and vegetables daily.She consumes 1 sweetened beverage(s) daily.She exercises with enough effort to increase her heart rate 10 to 19 minutes per day.  She exercises with enough effort to increase her heart rate 3 or less days per week. She is missing 2 dose(s) of medications per week.       Here today with mom for follow-up on night sweats.    Family just returned from [place] last Tuesday.  For the last 1 to 2 weeks has been having drenching night sweats where she has to get up in the middle of the night and change her bedding, take a shower and change her clothing.  Had a slight sore throats while she was in [place] for 1 day but otherwise has had no other symptoms.  There is been no fever.  Tested for COVID twice while she was there and it was negative twice.  Mom was sick while they were in [place] and did have a positive COVID test but then repeat testing was all negative.  Appetite has been decreased. Mom still wonders about the tachycardia she has intermittently.  Patient endorses this may be anxiety  related.     Does work at a nursing home.  [place]  No known tick bites but does have exposure.        Review of Systems   Constitutional: Negative.    HENT: Negative.    Eyes: Negative.    Respiratory: Negative.    Cardiovascular: Negative.    Gastrointestinal: Negative.    Endocrine: Negative.    Breasts:  negative.    Genitourinary: Negative.    Musculoskeletal: Negative.    Skin: Negative.    Allergic/Immunologic: Negative.    Neurological: Negative.    Hematological: Negative.    Psychiatric/Behavioral: Negative.             Objective    Pulse 77   Wt 51.2 kg (112 lb 12.8 oz)   LMP 09/26/2022 (Approximate)   SpO2 99%   BMI 19.19 kg/m    Body mass index is 19.19 kg/m .     Physical Exam     General:  Alert and oriented, No acute distress.    Eye:  Pupils are equal, round and reactive to light, Extraocular movements are intact, sclera clear.  HENT:  Tympanic membranes are clear, Oral mucosa is moist, No pharyngeal erythema.  Lymph: No specific enlarged nodes noted in anterior posterior cervical lymph node chains.  Small fingertip sized lymph node noted in left supraclavicular area and left axillary area.  No nodes noted in right supraclavicular or right axillary area.  Respiratory:  Lungs clear to auscultation bilaterally.  Equal air entry.  Symmetrical chest expansion.  No wheezing.    Cardiovascular:  S1 and S2 with regular rate and rhythm.  No murmurs.  Pulses 2+ in all four extremities.  Brisk capillary refill.   Gastrointestinal:  Positive bowel sounds in all four quadrants.  Abdomen is soft, non-distended, non-tender.  No hepatosplenomegaly.    Integumentary: Dry irritated flaky skin noted at entrance of ear canals bilaterally.  No ecchymoses or petechiae.  Somewhat pale in appearance.  Neurologic:  No focal deficits.     Latest Reference Range & Units 09/30/22 09:30   TSH 0.30 - 4.20 uIU/mL 1.35   WBC 4.0 - 11.0 10e3/uL 3.3 (L)   Hemoglobin 11.7 - 15.7 g/dL 15.2   Hematocrit 35.0 -  47.0 % 47.4 (H)   Platelet Count 150 - 450 10e3/uL 101 (L)   RBC Count 3.80 - 5.20 10e6/uL 5.20   MCV 78 - 100 fL 91   MCH 26.5 - 33.0 pg 29.2   MCHC 31.5 - 36.5 g/dL 32.1   RDW 10.0 - 15.0 % 12.2   % Neutrophils % 46   % Lymphocytes % 42   % Monocytes % 10   % Eosinophils % 1   % Basophils % 1   Absolute Basophils 0.0 - 0.2 10e3/uL 0.0   Absolute Eosinophils 0.0 - 0.7 10e3/uL 0.0   Absolute Immature Granulocytes <=0.4 10e3/uL 0.0   Absolute Lymphocytes 0.8 - 5.3 10e3/uL 1.4   Absolute Monocytes 0.0 - 1.3 10e3/uL 0.3   % Immature Granulocytes % 0   Absolute Neutrophils 1.6 - 8.3 10e3/uL 1.6   Sed Rate 0 - 20 mm/hr 6   CHLAMYDIA TRACHOMATIS PCR  Rpt   NEISSERIA GONORRHOEAE PCR  Rpt   Hepatitis C Antibody Nonreactive  Nonreactive   HIV Antigen Antibody Combo Nonreactive  Nonreactive   (L): Data is abnormally low  (H): Data is abnormally high  Rpt: View report in Results Review for more information    EXAM: XR CHEST 2 VIEWS  LOCATION: [facility]  DATE/TIME: 9/30/2022 9:19 AM     INDICATION:  Night sweats  COMPARISON: None.                                                                      IMPRESSION: Lungs are clear. No adenopathy or pleural effusions. Heart and pulmonary vascularity are normal. No signs of acute disease. No signs of TB.

## 2022-10-04 LAB
ALBUMIN SERPL BCG-MCNC: 4.1 G/DL (ref 3.5–5.2)
ALP SERPL-CCNC: 81 U/L (ref 35–104)
ALT SERPL W P-5'-P-CCNC: 80 U/L (ref 10–35)
ANION GAP SERPL CALCULATED.3IONS-SCNC: 9 MMOL/L (ref 7–15)
AST SERPL W P-5'-P-CCNC: 72 U/L (ref 10–35)
BASOPHILS # BLD AUTO: 0 10E3/UL (ref 0–0.2)
BASOPHILS NFR BLD AUTO: 1 %
BILIRUB SERPL-MCNC: 0.4 MG/DL
BUN SERPL-MCNC: 13.5 MG/DL (ref 6–20)
CALCIUM SERPL-MCNC: 9 MG/DL (ref 8.6–10)
CHLORIDE SERPL-SCNC: 102 MMOL/L (ref 98–107)
CREAT SERPL-MCNC: 0.62 MG/DL (ref 0.51–0.95)
DEPRECATED HCO3 PLAS-SCNC: 27 MMOL/L (ref 22–29)
EOSINOPHIL # BLD AUTO: 0 10E3/UL (ref 0–0.7)
EOSINOPHIL NFR BLD AUTO: 1 %
ERYTHROCYTE [DISTWIDTH] IN BLOOD BY AUTOMATED COUNT: 12.2 % (ref 10–15)
GFR SERPL CREATININE-BSD FRML MDRD: >90 ML/MIN/1.73M2
GLUCOSE SERPL-MCNC: 91 MG/DL (ref 70–99)
HCT VFR BLD AUTO: 41.7 % (ref 35–47)
HGB BLD-MCNC: 13.6 G/DL (ref 11.7–15.7)
IMM GRANULOCYTES # BLD: 0 10E3/UL
IMM GRANULOCYTES NFR BLD: 0 %
LYMPHOCYTES # BLD AUTO: 4.3 10E3/UL (ref 0.8–5.3)
LYMPHOCYTES NFR BLD AUTO: 62 %
MCH RBC QN AUTO: 29.6 PG (ref 26.5–33)
MCHC RBC AUTO-ENTMCNC: 32.6 G/DL (ref 31.5–36.5)
MCV RBC AUTO: 91 FL (ref 78–100)
MONOCYTES # BLD AUTO: 0.5 10E3/UL (ref 0–1.3)
MONOCYTES NFR BLD AUTO: 8 %
NEUTROPHILS # BLD AUTO: 2 10E3/UL (ref 1.6–8.3)
NEUTROPHILS NFR BLD AUTO: 29 %
PLATELET # BLD AUTO: 143 10E3/UL (ref 150–450)
POTASSIUM SERPL-SCNC: 4.7 MMOL/L (ref 3.4–5.3)
PROT SERPL-MCNC: 6.8 G/DL (ref 6.4–8.3)
RBC # BLD AUTO: 4.59 10E6/UL (ref 3.8–5.2)
SODIUM SERPL-SCNC: 138 MMOL/L (ref 136–145)
WBC # BLD AUTO: 6.9 10E3/UL (ref 4–11)

## 2022-10-05 LAB
A PHAGOCYTOPH IGG TITR SER IF: NORMAL {TITER}
A PHAGOCYTOPH IGM TITR SER IF: NORMAL {TITER}
B BURGDOR IGG+IGM SER QL: 0.12
CMV IGG SERPL IA-ACNC: <0.2 U/ML
CMV IGG SERPL IA-ACNC: NORMAL
CMV IGM SERPL IA-ACNC: <8 AU/ML
CMV IGM SERPL IA-ACNC: NEGATIVE
EBV VCA IGG SER IA-ACNC: 85 U/ML
EBV VCA IGG SER IA-ACNC: POSITIVE
EBV VCA IGM SER IA-ACNC: >160 U/ML
EBV VCA IGM SER IA-ACNC: ABNORMAL

## 2023-05-05 ENCOUNTER — OFFICE VISIT (OUTPATIENT)
Dept: FAMILY MEDICINE | Facility: CLINIC | Age: 23
End: 2023-05-05
Payer: COMMERCIAL

## 2023-05-05 VITALS
OXYGEN SATURATION: 98 % | HEART RATE: 74 BPM | WEIGHT: 116.5 LBS | HEIGHT: 64 IN | DIASTOLIC BLOOD PRESSURE: 66 MMHG | SYSTOLIC BLOOD PRESSURE: 104 MMHG | RESPIRATION RATE: 16 BRPM | TEMPERATURE: 97 F | BODY MASS INDEX: 19.89 KG/M2

## 2023-05-05 DIAGNOSIS — F41.9 ANXIETY: Primary | ICD-10-CM

## 2023-05-05 DIAGNOSIS — I78.1 NEVUS, NON-NEOPLASTIC: ICD-10-CM

## 2023-05-05 DIAGNOSIS — Z30.41 ENCOUNTER FOR SURVEILLANCE OF CONTRACEPTIVE PILLS: ICD-10-CM

## 2023-05-05 PROCEDURE — 90471 IMMUNIZATION ADMIN: CPT | Performed by: PEDIATRICS

## 2023-05-05 PROCEDURE — 90715 TDAP VACCINE 7 YRS/> IM: CPT | Performed by: PEDIATRICS

## 2023-05-05 PROCEDURE — 99213 OFFICE O/P EST LOW 20 MIN: CPT | Mod: 25 | Performed by: PEDIATRICS

## 2023-05-05 RX ORDER — SERTRALINE HYDROCHLORIDE 100 MG/1
100 TABLET, FILM COATED ORAL DAILY
Qty: 90 TABLET | Refills: 1 | Status: SHIPPED | OUTPATIENT
Start: 2023-05-05 | End: 2023-06-21

## 2023-05-05 RX ORDER — NORGESTIMATE AND ETHINYL ESTRADIOL 0.25-0.035
1 KIT ORAL DAILY
COMMUNITY
Start: 2023-04-09 | End: 2023-06-21

## 2023-05-05 RX ORDER — DROSPIRENONE AND ETHINYL ESTRADIOL 0.02-3(28)
1 KIT ORAL DAILY
Qty: 84 TABLET | Refills: 3 | Status: SHIPPED | OUTPATIENT
Start: 2023-05-05

## 2023-05-05 NOTE — PATIENT INSTRUCTIONS
Please call [facility] [place] at [phone] for an appointment.  Let me know if you have trouble scheduling.

## 2023-05-05 NOTE — PROGRESS NOTES
"  Assessment & Plan     (F41.9) Anxiety  (primary encounter diagnosis)    Plan: sertraline (ZOLOFT) 100 MG tablet           (Z30.41) Encounter for surveillance of contraceptive pills    Plan: drospirenone-ethinyl estradiol (DORI) 3-0.02 MG         tablet            (I78.1) Nevus, non-neoplastic    Plan: Adult Dermatology Referral                Lias Jaffe MD  [facility]    Poppy Liu is a 23 year old, presenting for the following health issues:  Recheck Medication (Discuss birth control with possible change), MH Follow Up (Refill anxiety/depression medication), and Derm Problem (Bump on nose requesting referral to derm )         View : No data to display.              History of Present Illness       Reason for visit:  New birth control maybe and talk about maybe going to see a bump on my nose by a dermatologist    She eats 0-1 servings of fruits and vegetables daily.She consumes 2 sweetened beverage(s) daily.She exercises with enough effort to increase her heart rate 20 to 29 minutes per day.  She exercises with enough effort to increase her heart rate 3 or less days per week.   She is taking medications regularly.           Here today for a spot on her nose.   Mole different than other moles.     Birth control she was using is discontinued.  Pharmacy gave her a different one, is having a bit more acne.  No special face wash.      Moods are okay.  Parents are getting a divorce.  Mom is living in family farm house.  She recently moved back into dad's house.  Had been living in her mom's apartment in [place].  Living on her own is expensive.  She and dad don't always bet along.  Get along better when she doesn't see him as often.  Sleep: is okay if she goes to bed on time.              Objective    /66 (BP Location: Right arm, Patient Position: Sitting, Cuff Size: Adult Regular)   Pulse 74   Temp 97  F (36.1  C) (Tympanic)   Resp 16   Ht 1.626 m (5' 4\")   Wt 52.8 kg " (116 lb 8 oz)   LMP 05/02/2023 (Exact Date)   SpO2 98%   BMI 20.00 kg/m    Body mass index is 20 kg/m .     Physical Exam     Gen: Good eye contact, bright affect  Skin: Papular brown lesion on bridge of nose, just under rim of glasses. Borders are regular.

## 2023-06-20 ENCOUNTER — E-VISIT (OUTPATIENT)
Dept: FAMILY MEDICINE | Facility: CLINIC | Age: 23
End: 2023-06-20
Payer: COMMERCIAL

## 2023-06-20 DIAGNOSIS — F41.9 ANXIETY: ICD-10-CM

## 2023-06-20 PROCEDURE — 99422 OL DIG E/M SVC 11-20 MIN: CPT | Performed by: PEDIATRICS

## 2023-06-20 ASSESSMENT — ANXIETY QUESTIONNAIRES
GAD7 TOTAL SCORE: 13
6. BECOMING EASILY ANNOYED OR IRRITABLE: MORE THAN HALF THE DAYS
2. NOT BEING ABLE TO STOP OR CONTROL WORRYING: MORE THAN HALF THE DAYS
7. FEELING AFRAID AS IF SOMETHING AWFUL MIGHT HAPPEN: MORE THAN HALF THE DAYS
GAD7 TOTAL SCORE: 13
8. IF YOU CHECKED OFF ANY PROBLEMS, HOW DIFFICULT HAVE THESE MADE IT FOR YOU TO DO YOUR WORK, TAKE CARE OF THINGS AT HOME, OR GET ALONG WITH OTHER PEOPLE?: VERY DIFFICULT
1. FEELING NERVOUS, ANXIOUS, OR ON EDGE: MORE THAN HALF THE DAYS
3. WORRYING TOO MUCH ABOUT DIFFERENT THINGS: NEARLY EVERY DAY
GAD7 TOTAL SCORE: 13
5. BEING SO RESTLESS THAT IT IS HARD TO SIT STILL: SEVERAL DAYS
7. FEELING AFRAID AS IF SOMETHING AWFUL MIGHT HAPPEN: MORE THAN HALF THE DAYS
4. TROUBLE RELAXING: SEVERAL DAYS

## 2023-06-20 ASSESSMENT — PATIENT HEALTH QUESTIONNAIRE - PHQ9
SUM OF ALL RESPONSES TO PHQ QUESTIONS 1-9: 7
10. IF YOU CHECKED OFF ANY PROBLEMS, HOW DIFFICULT HAVE THESE PROBLEMS MADE IT FOR YOU TO DO YOUR WORK, TAKE CARE OF THINGS AT HOME, OR GET ALONG WITH OTHER PEOPLE: VERY DIFFICULT
SUM OF ALL RESPONSES TO PHQ QUESTIONS 1-9: 7

## 2023-06-21 RX ORDER — SERTRALINE HYDROCHLORIDE 100 MG/1
TABLET, FILM COATED ORAL
Qty: 30 TABLET | Refills: 11 | Status: SHIPPED | OUTPATIENT
Start: 2023-06-21 | End: 2023-11-27

## 2023-06-21 ASSESSMENT — ANXIETY QUESTIONNAIRES: GAD7 TOTAL SCORE: 13

## 2023-06-21 ASSESSMENT — PATIENT HEALTH QUESTIONNAIRE - PHQ9: SUM OF ALL RESPONSES TO PHQ QUESTIONS 1-9: 7

## 2023-06-21 NOTE — PATIENT INSTRUCTIONS
"    Recognizing Suicide Warning Signs in Yourself    People who are thinking about suicide may not know they are depressed. Certain thoughts, feelings, and actions can be signals that let you know you may need help. The best thing you can do is watch for signs that you may be at risk. Then, ask for help. You can talk with your regular healthcare provider or get help from a mental health provider.  Depression  Depression is a treatable illness, just like diabetes or heart disease. And like those illnesses, depression is not something that you can just \"snap out of.\" To feel better, treatment is needed before depression gets to a point that it can endanger your life. To know if depression is causing you to feel like ending your life, ask yourself:    Do I feel worthless, guilty, helpless, or hopeless?    Have I been feeling sad, down, or blue on most days?    Have I lost interest in my work or people I used to enjoy?    Do I have trouble sleeping or do I sleep too much?    Do I eat more or less than normal?    Do I feel tired, weak, and low on energy?    Do I feel restless and unable to sit still?    Do I have trouble thinking or making choices?    Do I cry more than normal?    Do I feel life isn't worth living?  Warning signs for suicide  Call your healthcare provider or get help right away if you have any of the warning signs below. You can also call a mental health clinic or the suicide prevention lifeline for help and support. Warning signs for suicide include:    Thinking often about taking your life    Planning how you may attempt it    Talking or writing about suicide    Feeling that death is the only solution to your problems    Feeling a pressing need to make out your will or arrange your     Giving away things you own    Taking part in risky behaviors, such as having sex with someone you don't know, or drinking and driving    Buying a lethal weapon, such as a gun, or hoarding medicines that could be " used in an overdose  Call or text 988  If you are in immediate risk of harming yourself or others, call 988.  When you call or text 988, you will be connected to trained crisis counselors. An online chat option is also available. Lifeline is free and available 24/7.  To learn more  For more information about depression and suicide prevention:    National Suicide Prevention Lifeline at www.suicidepreventionlifeline.org  or 800-851-XULV (632-281-4740)    National [place] on Mental Illness at www.misti.org or 675-477-9703    Mental Health [facility] at www.[facility].org or 166-774-8591    National [place] of Mental Health at www.nimh.nih.gov or 337-883-3341  Tahir last reviewed this educational content on 7/1/2022 2000-2022 [employer]. All rights reserved. This information is not intended as a substitute for professional medical care. Always follow your healthcare professional's instructions.

## 2023-06-21 NOTE — TELEPHONE ENCOUNTER
"Provider E-Visit time total (minutes): 15 minutes    Patient with increased anxiety/deprssion symptoms.  I asked her to respond if there were any suicidal thoughts.      \"If that were to be the case I would want to see you in clinic tomorrow or even have you see a partner of mine in person today or consider ED.       If no suicidal thoughts, I think we should increase your Zoloft and see how that goes as a first step.  I will send over 50 mg tabs to add to you 100 mg so you will take 150 mg once daily. After you start the new dose, I would like to see you in clinic for follow up in about 4 weeks, sooner if you felt something wasn't going well.      If you aren't currently seeing a therapist for cognitive behavioral therapy, that also would be a good idea.\"    Sent Zoloft 50 mg to [facility] in [place], patient to follow up in 4 weeks.     Lisa Jaffe MD on 6/21/2023 at 11:38 AM            "

## 2023-06-26 ENCOUNTER — TELEPHONE (OUTPATIENT)
Dept: FAMILY MEDICINE | Facility: CLINIC | Age: 23
End: 2023-06-26

## 2023-06-26 NOTE — TELEPHONE ENCOUNTER
Can you please reach out to patient via phone and ensure she got my [employer] message?  I did send higher dose of medication but always want to double check she is not having suicidal thoughts, etc.      Lisa Jaffe MD on 6/26/2023 at 8:43 AM

## 2023-06-26 NOTE — TELEPHONE ENCOUNTER
"  S-(situation):   Patient called stating returning call.    B-(background):   6/26/23 Provider sent messages to RN's to fcall patient for follow up.  RN left  x 2    A-(assessment):   Writer spoke with patient, relayed provider message.  Patient responded with \"I already answered those questions via My Chart\"  (patient would not answer question of suicidal thoughts- as she already answered questions via my chart).    PHQ-9 score:      6/20/2023     1:41 PM   PHQ   PHQ-9 Total Score 7   Q9: Thoughts of better off dead/self-harm past 2 weeks Not at all         R-(recommendations):   Writer stressed \" If having suicidal thoughts\" to go to ED.      Patient instructed to call back if symptoms change or if new symptoms present.     ANTONIO Chowdary  [place], [place]  [phone]      "

## 2023-06-26 NOTE — TELEPHONE ENCOUNTER
Attempted to call pt, went directly to VM. VM left, requesting return call for Evisit follow up and if having suicidal thoughts.

## 2023-07-06 ENCOUNTER — TRANSFERRED RECORDS (OUTPATIENT)
Dept: HEALTH INFORMATION MANAGEMENT | Facility: CLINIC | Age: 23
End: 2023-07-06
Payer: COMMERCIAL

## 2023-07-17 ENCOUNTER — E-VISIT (OUTPATIENT)
Dept: FAMILY MEDICINE | Facility: CLINIC | Age: 23
End: 2023-07-17
Payer: COMMERCIAL

## 2023-07-17 DIAGNOSIS — F41.9 ANXIETY: Primary | ICD-10-CM

## 2023-07-17 PROCEDURE — 99421 OL DIG E/M SVC 5-10 MIN: CPT | Performed by: FAMILY MEDICINE

## 2023-07-18 NOTE — PATIENT INSTRUCTIONS
Dr. Jaffe is out of the office until Friday [facility] I am happy to place a referral to our behavioral health services. Someone should call you from the scheduling office to do an intake and help to figure out what the best next step will be. I'll also ask our Behavioral Health Clinician, Perla, to reach out to you. If you would like for us to look for an appointment for you to follow up with Dr. Jaffe, let us know.    View your full visit summary for details by clicking on the link below.     If you're not feeling better within 2-3 days, please respond to this message and we can consider if a prescription is needed.  You can schedule an appointment right here in [facility], or call [phone]  If the visit is for the same symptoms as your eVisit, we'll refund the cost of your eVisit if seen within seven days.      Alex Alegria MD

## 2023-07-19 ENCOUNTER — TELEPHONE (OUTPATIENT)
Dept: BEHAVIORAL HEALTH | Facility: CLINIC | Age: 23
End: 2023-07-19

## 2023-07-19 NOTE — TELEPHONE ENCOUNTER
Pt is a(n) adult (18+ out of HS) Seeking as eval for Adult Mental Health DA for evaluation and recommendations..  Appointment scheduled by:  Patient.  (self-pay - complete Cost Estimate)  Caller name:  Alexa Nguyen    Caller phone #: [phone]  Legal Guardianship Reviewed?  No  Honoring Choices Notified?  No  Brief reason for appt:  Mh eval     needed?  NO    Contact information verified/updated: Yes    No Humphreys

## 2023-07-20 ENCOUNTER — HOSPITAL ENCOUNTER (OUTPATIENT)
Dept: BEHAVIORAL HEALTH | Facility: CLINIC | Age: 23
Discharge: HOME | End: 2023-07-20
Attending: FAMILY MEDICINE | Admitting: FAMILY MEDICINE
Payer: COMMERCIAL

## 2023-07-20 ENCOUNTER — TELEPHONE (OUTPATIENT)
Dept: BEHAVIORAL HEALTH | Facility: CLINIC | Age: 23
End: 2023-07-20
Payer: COMMERCIAL

## 2023-07-20 ENCOUNTER — MYC MEDICAL ADVICE (OUTPATIENT)
Dept: BEHAVIORAL HEALTH | Facility: CLINIC | Age: 23
End: 2023-07-20
Payer: COMMERCIAL

## 2023-07-20 PROCEDURE — 90791 PSYCH DIAGNOSTIC EVALUATION: CPT | Performed by: COUNSELOR

## 2023-07-20 ASSESSMENT — COLUMBIA-SUICIDE SEVERITY RATING SCALE - C-SSRS
6. HAVE YOU EVER DONE ANYTHING, STARTED TO DO ANYTHING, OR PREPARED TO DO ANYTHING TO END YOUR LIFE?: NO
4. HAVE YOU HAD THESE THOUGHTS AND HAD SOME INTENTION OF ACTING ON THEM?: NO
5. HAVE YOU STARTED TO WORK OUT OR WORKED OUT THE DETAILS OF HOW TO KILL YOURSELF? DO YOU INTEND TO CARRY OUT THIS PLAN?: NO
2. HAVE YOU ACTUALLY HAD ANY THOUGHTS OF KILLING YOURSELF IN THE PAST MONTH?: YES
1. IN THE PAST MONTH, HAVE YOU WISHED YOU WERE DEAD OR WISHED YOU COULD GO TO SLEEP AND NOT WAKE UP?: YES
2. HAVE YOU ACTUALLY HAD ANY THOUGHTS OF KILLING YOURSELF LIFETIME?: NO
3. HAVE YOU BEEN THINKING ABOUT HOW YOU MIGHT KILL YOURSELF?: NO
1. IN THE PAST MONTH, HAVE YOU WISHED YOU WERE DEAD OR WISHED YOU COULD GO TO SLEEP AND NOT WAKE UP?: YES

## 2023-07-20 ASSESSMENT — ANXIETY QUESTIONNAIRES
5. BEING SO RESTLESS THAT IT IS HARD TO SIT STILL: SEVERAL DAYS
GAD7 TOTAL SCORE: 11
1. FEELING NERVOUS, ANXIOUS, OR ON EDGE: MORE THAN HALF THE DAYS
7. FEELING AFRAID AS IF SOMETHING AWFUL MIGHT HAPPEN: SEVERAL DAYS
4. TROUBLE RELAXING: SEVERAL DAYS
IF YOU CHECKED OFF ANY PROBLEMS ON THIS QUESTIONNAIRE, HOW DIFFICULT HAVE THESE PROBLEMS MADE IT FOR YOU TO DO YOUR WORK, TAKE CARE OF THINGS AT HOME, OR GET ALONG WITH OTHER PEOPLE: SOMEWHAT DIFFICULT
6. BECOMING EASILY ANNOYED OR IRRITABLE: MORE THAN HALF THE DAYS
GAD7 TOTAL SCORE: 11
2. NOT BEING ABLE TO STOP OR CONTROL WORRYING: MORE THAN HALF THE DAYS
3. WORRYING TOO MUCH ABOUT DIFFERENT THINGS: MORE THAN HALF THE DAYS

## 2023-07-20 ASSESSMENT — PATIENT HEALTH QUESTIONNAIRE - PHQ9
10. IF YOU CHECKED OFF ANY PROBLEMS, HOW DIFFICULT HAVE THESE PROBLEMS MADE IT FOR YOU TO DO YOUR WORK, TAKE CARE OF THINGS AT HOME, OR GET ALONG WITH OTHER PEOPLE: SOMEWHAT DIFFICULT
SUM OF ALL RESPONSES TO PHQ QUESTIONS 1-9: 13
SUM OF ALL RESPONSES TO PHQ QUESTIONS 1-9: 13

## 2023-07-20 ASSESSMENT — PAIN SCALES - GENERAL: PAINLEVEL: NO PAIN (0)

## 2023-07-20 NOTE — TELEPHONE ENCOUNTER
----- Message from Lorie Michaels sent at 7/19/2023  9:41 PM CDT -----  Regarding: BENEFITS FOR APPT TOMORROW MORNING 8 AM   7/20  Hello-    Please request benefits for appt tomorrow [facility] as I see [facility] just scheduled this tonight.    Thank you,    Lorie ARVIZU  Assessment Center Coordinator

## 2023-07-20 NOTE — PROGRESS NOTES
"    [facility]      PATIENT'S NAME: Alexa Nguyen  PREFERRED NAME: Alexa  PRONOUNS:   she/her    MRN: [id number]  : 2000  ADDRESS:  [address] [place] [id number]  ACCT. NUMBER:  [id number]  DATE OF SERVICE: 23  START TIME: 8:00 AM  END TIME: 9:58 AM  PREFERRED PHONE: [phone]  May we leave a program related message: Yes  SERVICE MODALITY:  In-person    UNIVERSAL ADULT Mental Health DIAGNOSTIC ASSESSMENT    Identifying Information:  Patient is a 23 year old,  individual.  Patient was referred for an assessment by her [facility].  Patient attended the session alone.    Chief Complaint:   The reason for seeking services at this time is: \"Anxiety and depression\". Patient reports making rash decisions which negatively affected her relationships with others, been crying a lot, down on herself, depressed/anxious, really bad lately, feeling not good enough, racing thoughts, low self worth The problem(s) began probably end of May or beginning of 2023.    Patient has attempted to resolve these concerns in the past through therapy.    Social/Family History:  Patient reported that she grew up in [facility].  She was raised by biological parents  .  Parents were always together.  Patient reported that their childhood was mother was supportive, mom is a ,  father was a farmer, absent in most of events, therapy as a child, not clsoe to her sister and she is 3 years older than her, parents are going through a divorce right, bickering with dad as a child.  Patient described their current relationships with family of origin as very with mom, and relationship with dad is improving at the moment, not talking very much who lived out of state.     The patient describes her cultural background as  -Ami/I[place]ian.  Cultural influences and impact on patient's life structure, values, norms, and " healthcare: NA?.  Contextual influences on patient's health include: NA.    These factors will be addressed in the Preliminary Treatment plan. Patient identified their preferred language to be English. Patient reported that she does not need the assistance of an  or other support involved in therapy.     Patient reported experienced significant delays in developmental tasks, such as stigmatism reading support group in elementary and in middle shool.   Patient's highest education level was high school graduate  .  Patient identified the following learning problems: reading.  Modifications will be used to assist communication in therapy. Patient reports that she is  able to understand written materials.    Patient reported the following relationship history :NA.  Patient's current relationship status is single for about a year.   Patient identified their sexual orientation as heterosexual.  Patient reported having no child(brenda). Patient identified mother; therapist; co-worker as part of their support system.  Patient identified the quality of these relationships as fair,  .      Patient's current living/housing situation involves staying with someone.  The immediate members of family and household include Manolo Nguyen, 63,Father and they report that housing is stable.    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  Patient reports that her finances are obtained through employment. Patient does not identify finances as a current stressor.      Patient reported that they have not been involved with the legal system. Patient does not report being under probation/ parole/ jurisdiction. They are not under any current court jurisdiction. .    Patient's Strengths and Limitations:  Patient identified the following strengths or resources that will help them succeed in treatment: commitment to health and well being, community involvement, exercise routine, friends  / good social support, family support, insight and motivation. Things that may interfere with the patient's success in treatment include: none identified.     Assessments:  The following assessments were completed by patient for this visit:  PHQ9:       4/5/2022     5:18 PM 6/20/2023     1:41 PM 7/20/2023     7:45 AM   PHQ-9 SCORE   PHQ-9 Total Score MyChart 3 (Minimal depression) 7 (Mild depression) 13 (Moderate depression)   PHQ-9 Total Score 3 7 13     GAD7:       4/5/2022     5:19 PM 6/20/2023     1:41 PM 7/20/2023     8:01 AM   STAR-7 SCORE   Total Score 6 (mild anxiety) 13 (moderate anxiety) 11 (moderate anxiety)   Total Score 6 13 11     CAGE-AID:       7/20/2023     8:09 AM   CAGE-AID Total Score   Total Score 1   Total Score MyChart 1 (A total score of 2 or greater is considered clinically significant)     PROMIS 10-Global Health (all questions and answers displayed):       7/20/2023     8:09 AM   PROMIS 10   In general, would you say your health is: Good   In general, would you say your quality of life is: Fair   In general, how would you rate your physical health? Fair   In general, how would you rate your mental health, including your mood and your ability to think? Fair   In general, how would you rate your satisfaction with your social activities and relationships? Poor   In general, please rate how well you carry out your usual social activities and roles Poor   To what extent are you able to carry out your everyday physical activities such as walking, climbing stairs, carrying groceries, or moving a chair? A little   In the past 7 days, how often have you been bothered by emotional problems such as feeling anxious, depressed, or irritable? Often   In the past 7 days, how would you rate your fatigue on average? None   In the past 7 days, how would you rate your pain on average, where 0 means no pain, and 10 means worst imaginable pain? 0   In general, would you say your health is: 3   In general, would  you say your quality of life is: 2   In general, how would you rate your physical health? 2   In general, how would you rate your mental health, including your mood and your ability to think? 2   In general, how would you rate your satisfaction with your social activities and relationships? 1   In general, please rate how well you carry out your usual social activities and roles. (This includes activities at home, at work and in your community, and responsibilities as a parent, child, spouse, employee, friend, etc.) 1   To what extent are you able to carry out your everyday physical activities such as walking, climbing stairs, carrying groceries, or moving a chair? 2   In the past 7 days, how often have you been bothered by emotional problems such as feeling anxious, depressed, or irritable? 4   In the past 7 days, how would you rate your fatigue on average? 1   In the past 7 days, how would you rate your pain on average, where 0 means no pain, and 10 means worst imaginable pain? 0   Global Mental Health Score 7   Global Physical Health Score 14   PROMIS TOTAL - SUBSCORES 21     Rush Suicide Severity Rating Scale (Lifetime/Recent)      7/20/2023     8:00 AM   Rush Suicide Severity Rating (Lifetime/Recent)   Q1 Wish to be Dead (Lifetime) Yes   Comments middle school=bullying (passive thoughts)   Q2 Non-Specific Active Suicidal Thoughts (Lifetime) No   Most Severe Ideation Rating (Lifetime) NA   Most Severe Ideation Description (Lifetime) NA   Frequency (Lifetime) NA   Duration (Lifetime) NA   Controllability (Lifetime) NA   Protective Factors  (Lifetime) NA   Reasons for Ideation (Lifetime) NA   Q1 Wished to be Dead (Past Month) yes   Q2 Suicidal Thoughts (Past Month) yes   Q3 Suicidal Thought Method no   Q4 Suicidal Intent without Specific Plan no   Q5 Suicide Intent with Specific Plan no   Q6 Suicide Behavior (Lifetime) no   Level of Risk per Screen low risk       Personal and Family Medical  History:  Patient does report a family history of mental health concerns.  Patient reports family history is not on file..     Patient does report Mental Health Diagnosis and/or Treatment.  Patient reported the following previous diagnoses which include(s): ADHD; an anxiety disorder; depression .  Patient reported symptoms began as a kid for ADHD.  Patient has received mental health services in the past:  therapy  .  Psychiatric Hospitalizations: none when   ,  ,  ,  ,  ,  ,  ,  ,  ,  ,  .  Patient denies a history of civil commitment.  Currently, patient is receiving other mental health services.  These include psychotherapy with Cathy [facility]AD[facility] on [facility] once a week.         Patient has had a physical exam to rule out medical causes for current symptoms.  Date of last physical exam was greater than a year ago and client was encouraged to schedule an exam with PCP. The patient has a [facility] Primary Care Provider, who is named Lisa Jaffe..  Patient reports no current medical and/or dental concerns.  Patient denies any issues with pain..   There are not significant appetite / nutritional concerns / weight changes.   Patient does not report a history of head injury / trauma / cognitive impairment.      Patient reports current meds as:   Outpatient Medications Marked as Taking for the 7/20/23 encounter (Hospital Encounter) [facility]   Medication Sig     drospirenone-ethinyl estradiol (DORI) 3-0.02 MG tablet Take 1 tablet by mouth daily     sertraline (ZOLOFT) 100 MG tablet 100 mg + 50 mg by mouth once daily (150 mg)     sertraline (ZOLOFT) 50 MG tablet 100 mg + 50 mg by mouth once daily (150 mg)       Medication Adherence:  Patient reports taking.  taking prescribed medications as prescribed.    Patient Allergies:  No Known Allergies    Medical History:    Past Medical History:   Diagnosis Date     NO ACTIVE PROBLEMS          Current Mental Status Exam:   Appearance:  Appropriate     Eye Contact:  Good   Psychomotor:  Restless       Gait / station:  no problem  Attitude / Demeanor: Cooperative  Interested  Speech      Rate / Production: Talkative      Volume:  Normal  volume      Language:  intact  Mood:   Anxious   Affect:   Appropriate    Thought Content: Clear   Thought Process: Coherent       Associations: No loosening of associations  Insight:   Fair   Judgment:  Intact   Orientation:  All  Attention/concentration: Fair      Substance Use:  Patient did not report a family history of substance use concerns; see medical history section for details.  Patient has not received chemical dependency treatment in the past.  Patient has not ever been to detox.      Patient is not currently receiving any chemical dependency treatment.           Substance History of use Age of first use Date of last use     Pattern and duration of use (include amounts and frequency)   Alcohol never used       REPORTS SUBSTANCE USE: N/A   Cannabis   never used     REPORTS SUBSTANCE USE: N/A     Amphetamines   never used     REPORTS SUBSTANCE USE: N/A   Cocaine/crack    never used       REPORTS SUBSTANCE USE: N/A   Hallucinogens never used         REPORTS SUBSTANCE USE: N/A   Inhalants never used         REPORTS SUBSTANCE USE: N/A   Heroin never used         REPORTS SUBSTANCE USE: N/A   Other Opiates never used     REPORTS SUBSTANCE USE: N/A   Benzodiazepine   never used     REPORTS SUBSTANCE USE: N/A   Barbiturates never used     REPORTS SUBSTANCE USE: N/A   Over the counter meds never used     REPORTS SUBSTANCE USE: N/A   Caffeine currently use Maybe 12-15  age I dont know  7/20/23 Drinks coffee or pepsi on occasional   Nicotine  never used     REPORTS SUBSTANCE USE: N/A   Other substances not listed above:  Identify:  never used     REPORTS SUBSTANCE USE: N/A     Patient reported the following problems as a result of her substance use: no problems, not applicable.    Substance Use: No symptoms    Based on the  negative CAGE score and clinical interview there  are not indications of drug or alcohol abuse.      Significant Losses / Trauma / Abuse / Neglect Issues:   Patient did not serve in the .  There are indications or report of significant loss, trauma, abuse or neglect issues related to: are no indications and client denies any losses, trauma, abuse, or neglect concerns.  Concerns for possible neglect are not present.     Safety Assessment:   Patient denies current homicidal ideation and behaviors.  Patient denies current self-injurious ideation and behaviors.    Patient denied risk behaviors associated with substance use.  Patient denies any high risk behaviors associated with mental health symptoms.  Patient reports the following current concerns for her personal safety: None.  Patient reports there are firearms in the house.     no, they are not secured. The firearms are not secured in a locked space. Client was advised to secure all firearms.    History of Safety Concerns:  Patient denied a history of homicidal ideation.     Patient denied a history of personal safety concerns.    Patient denied a history of assaultive behaviors.    Patient denied a history of sexual assault behaviors.     Patient denied a history of risk behaviors associated with substance use.  Patient denies any history of high risk behaviors associated with mental health symptoms.  Patient reports the following protective factors: dedication to family or friends; safe and stable environment; adherence with prescribed medication    Risk Plan:  See Recommendations for Safety and Risk Management Plan    Review of Symptoms per patient report:   Depression: Change in sleep, Lack of interest, Excessive or inappropriate guilt, Change in energy level, Difficulties concentrating, Change in appetite, Suicidal ideation, Feelings of hopelessness, Feelings of helplessness, Low self-worth, Ruminations, Irritability, Feeling sad, down, or depressed,  Withdrawn, Frequent crying and Anger outbursts  Olesya:  No Symptoms  Psychosis: No Symptoms  Anxiety: Excessive worry, Nervousness, Physical complaints, such as headaches, stomachaches, muscle tension, Separation anxiety, Sleep disturbance, Psychomotor agitation, Ruminations, Poor concentration, Irritability, Anger outbursts and separation anxiety with friends and scared if mom ever moved away  Panic:  Palpitations and heart racing 1-3 mos  Post Traumatic Stress Disorder:  No Symptoms   Eating Disorder: No Symptoms  ADD / ADHD:  Inattentive, Difficulties listening, Poor task completion, Poor organizational skills, Distractibility, Forgetful, Interrupts, Intrudes, Impulsive, Restlessness/fidgety, Hyperverbal and Hyperactive  Conduct Disorder: No symptoms  Autism Spectrum Disorder: No symptoms  Obsessive Compulsive Disorder: No Symptoms    Patient reports the following compulsive behaviors and treatment history: none.      Diagnostic Criteria:   Generalized Anxiety Disorder  A. Excessive anxiety and worry about a number of events or activities (such as work or school performance).   B. The person finds it difficult to control the worry.  C. Select 3 or more symptoms (required for diagnosis). Only one item is required in children.   - Restlessness or feeling keyed up or on edge.    - Being easily fatigued.    - Difficulty concentrating or mind going blank.    - Irritability.    - Muscle tension.    - Sleep disturbance (difficulty falling or staying asleep, or restless unsatisfying sleep).   D. The focus of the anxiety and worry is not confined to features of an Axis I disorder.  E. The anxiety, worry, or physical symptoms cause clinically significant distress or impairment in social, occupational, or other important areas of functioning.   F. The disturbance is not due to the direct physiological effects of a substance (e.g., a drug of abuse, a medication) or a general medical condition (e.g., hyperthyroidism) and does  not occur exclusively during a Mood Disorder, a Psychotic Disorder, or a Pervasive Developmental Disorder. Major Depressive Disorder  CRITERIA (A-C) REPRESENT A MAJOR DEPRESSIVE EPISODE - SELECT THESE CRITERIA  A) Recurrent episode(s) - symptoms have been present during the same 2-week period and represent a change from previous functioning 5 or more symptoms (required for diagnosis)   - Depressed mood. Note: In children and adolescents, can be irritable mood.     - Diminished interest or pleasure in all, or almost all, activities.    - Significant weight gaindecrease in appetite.    - Decreased sleep.    - Psychomotor activity agitation.    - Fatigue or loss of energy.    - Feelings of worthlessness or inappropriate guilt.    - Diminished ability to think or concentrate, or indecisiveness.    - Recurrent thoughts of death (not just fear of dying), recurrent suicidal ideation without a specific plan, or a suicide attempt or a specific plan for committing suicide.   B) The symptoms cause clinically significant distress or impairment in social, occupational, or other important areas of functioning  C) The episode is not attributable to the physiological effects of a substance or to another medical condition  D) The occurence of major depressive episode is not better explained by other thought / psychotic disorders  E) There has never been a manic episode or hypomanic episode Attention Deficit Hyperactivity Disorder  A) A persistent pattern of inattention and/or hyperactivity-impulsivity that interferes with functioning or development, as characterized by (1) Inattention and/or (2) Hyperactivity and Impulsivity  (1) Inattention: 6 or more of the following symptoms have persisted for at least 6 months to a degree that is inconsistent with developmental level and that negatively impacts directly on social and academic/occupational activities:  - Often fails to give close attention to details or makes careless mistakes in  "schoolwork, at work, or during other activities  - Often has difficulty sustaining attention in tasks or play activities  - Often does not seem to listen when spoken to directly  - Often does not follow through on instructions and fails to finish schoolwork, chores, or duties in the workplace  - Often has difficulty organizing tasks and activities  - Often avoids, dislikes, or is reluctant to engage in tasks that require sustained mental effort  - Often loses things necessary for tasks or activities  - Is often easily distractedby extraneous stimuli  - Is often forgetful in daily activities  (2) Hyeractivity and Impulsivity: 6 or more of the following symptoms have persisted for at least 6 months to a degree that is inconsistent with developmental level and that negatively impacts directly on social and academic/occupational activities:  - Often fidgets with or taps hands or feet or squirms in seat  - Often leaves seat in situations when remaining seated is expected  - Often runs about or climbs in situationswhere it is inappropriate  - Often unable to play or engage in leisure activities quietly  - Is often \"on the go,\" acting as if \"driven by a motor\"  - Often talks excessively  - Often blurts out an answer before a question has been completed  - Often has difficulty waiting his or her turn  - Often interrupts or intrudes on others  B) Several inattentive or hyperactive-impulsive symptoms were present prior to age 12 years  C) Several inattentive or hyperactive-impulsive symptoms are present in two or more settings  D) There is clear evidence that the symptoms interfere with, or reduce the quality of, social academic, or occupational functioning  E) The Symptoms do not occur exclusively during the course of schizophrenia or another psychotic disorder and are not better explained by another mental disorder    Functional Status:  Patient reports the following functional impairments:  home life; organization; " relationship(s); self care; social interactions.     Nonprogrammatic care:  Patient is requesting basic services to address current mental health concerns.    Clinical Summary:  1. Reason for assessment: Seeking the appropriate level of care.  2. Psychosocial, Cultural and Contextual Factors: parents' divorce, bullying.  3. Principal DSM5 Diagnoses  (Sustained by DSM5 Criteria Listed Above):   296.32 (F33.1) Major Depressive Disorder, Recurrent Episode, Moderate _ and With anxious distress  300.02 (F41.1) Generalized Anxiety Disorder.  4. Other Diagnoses that is relevant to services:   Attention-Deficit/Hyperactivity Disorder  314.01 (F90.2) Combined presentation.  5. Provisional Diagnosis:  309.21 (F93.0) Separation Anxiety Disorder.  6. Prognosis: Expect Improvement and Relieve Acute Symptoms.  7. Likely consequences of symptoms if not treated: patient s ongoing symptoms are more than likely to get worse and experience a decreased daily in functioning and may require a higher level of care.  8. Client strengths include:  caring, committed to sobriety, creative, educated, empathetic, employed, goal-focused, has a previous history of therapy, insightful, intelligent, motivated, open to learning, open to suggestions / feedback, support of family, friends and providers, supportive and wants to learn .     Recommendations:     1. Plan for Safety and Risk Management:   Safety and Risk: Recommended that patient call 911 or go to the local ED should there be a change in any of these risk factors..          Report to child / adult protection services was NA.     2. Patient's identified no rossana / Advent / spiritual influences relevant to services at this time.    3. Initial Treatment will focus on:    Depressed Mood -    Anxiety -    Relational Problems related to: Conflict or difficulties with friends   Mood Instability -    Risk Management / Safety Concerns related to: Suicidal ideation.     4. Resources/Service  "Plan:    services are not indicated.   Modifications to assist communication are not indicated.   Additional disability accommodations are not indicated.      5. Collaboration:   Collaboration / coordination of treatment will be initiated with the following support professionals: Targeted Case Management (TCM). Lorie Patel (Mother) [phone] (Mobile)     6.  Referrals:   The following referral(s) will be initiated: Outpatient [facility]  Psychiatry  DBT Skills. Next Scheduled Appointment: Patient was referred to the patient navigator personnel to assist her with securing services.      A Release of Information has been obtained for the following: Targeted Case Management (TCM).     Emergency Contact BRANDEE was obtained.      Clinical Substantiation/medical necessity for the above recommendations:  Patient is a 23-year-old heterosexual  single female who presents with a history of ADHD, anxiety, and depression. Patient is seeking services currently due to \"Anxiety and depression\". Patient reports making rash decisions which negatively affected her relationships with others, been crying a lot, down on herself, depressed/anxious, bad lately, feeling not good enough, racing thoughts, low self-worth.   Patient endorses with Change in sleep, Lack of interest, Excessive or inappropriate guilt, change in energy level, Difficulties concentrating, change in appetite, Suicidal ideation, Feelings of hopelessness, Feelings of helplessness, Low self-worth, Ruminations, Irritability, feeling sad, down, or depressed, Withdrawn, Frequent crying and Anger outbursts. Excessive worry, Nervousness, Physical complaints, such as headaches, stomachaches, muscle tension, Separation anxiety, Sleep disturbance, Psychomotor agitation, Ruminations, Poor concentration, Irritability, Anger outbursts and separation anxiety with friends and scared if mom ever moved away. Inattentive, Difficulties listening, Poor " task completion, Poor organizational skills, Distractibility, Forgetful, Interrupts, Intrudes, Impulsive, Restlessness/fidgety, Hyperverbal and Hyperactive.     Patient endorses with no prior suicide attempts, however, reports suicidal ideations/thoughts with no intent/plan in the past month. Patient agrees with referral to individual therapy, psychiatry and DNT skills and was referred to the coordination to assist patient with services. Patient's acute suicide risk was determined to be low due to the following factors: Admission of current suicidal ideations/thoughts but denies any past suicidal behaviors. Patient is not currently under the influence of alcohol or illicit substances, denies experiencing command hallucinations, and has no direct access to firearms. Patient's acute risk could be higher if noncompliant with treatment plan, medications, follow-up appointments or using illicit substances or alcohol. Protective factors include dedication to family or friends; safe and stable environment; adherence with prescribed medication. Patient reports suicidal ideations/thoughts with no intent or plan in the past month and denies any suicidal behaviors, and is not currently using illicit substances, however, no safety plan was developed. Patient instructed to present to her nearest emergency room if symptoms get worse.    7. TRACY:    TRACY:  Discussed the general effects of drugs and alcohol on health and well-being. Provider gave patient printed information about the effects of chemical use on her health and well being. Recommendations:  none .     8. Records:   These were reviewed at time of assessment.   Information in this assessment was obtained from the medical record and provided by patient who is a fair historian.  Patient will have open access to her mental health medical record.    9.   Interactive Complexity: No      Provider Name/ Credentials:  Parviz Bell, LANCE, APOLLO  Dual   Phone:  [phone]  Fax: [phone]    July 20, 2023

## 2023-08-02 ENCOUNTER — PATIENT OUTREACH (OUTPATIENT)
Dept: CARE COORDINATION | Facility: CLINIC | Age: 23
End: 2023-08-02
Payer: COMMERCIAL

## 2023-08-13 DIAGNOSIS — F41.9 ANXIETY: ICD-10-CM

## 2023-08-13 NOTE — TELEPHONE ENCOUNTER
"Last Written Prescription Date:  23  Last Fill Quantity: 30,  # refills: 11   Last office visit provider:  23    Requesting 90 day supply     Requested Prescriptions   Pending Prescriptions Disp Refills    sertraline (ZOLOFT) 50 MG tablet [Pharmacy Med Name: SERTRALINE HCL 50 MG TABLET] 90 tablet 4     Si MG + 50 MG BY MOUTH ONCE DAILY (150 MG)       SSRIs Protocol Passed - 2023 11:32 AM        Passed - Recent (12 mo) or future (30 days) visit within the authorizing provider's specialty     Patient has had an office visit with the authorizing provider or a provider within the authorizing providers department within the previous 12 mos or has a future within next 30 days. See \"Patient Info\" tab in inbasket, or \"Choose Columns\" in Meds & Orders section of the refill encounter.              Passed - Medication is active on med list        Passed - Patient is age 18 or older        Passed - No active pregnancy on record        Passed - No positive pregnancy test in last 12 months             ANGEL MORALES RN 23 4:22 PM  "

## 2023-08-16 ENCOUNTER — PATIENT OUTREACH (OUTPATIENT)
Dept: CARE COORDINATION | Facility: CLINIC | Age: 23
End: 2023-08-16
Payer: COMMERCIAL

## 2023-10-22 ENCOUNTER — HEALTH MAINTENANCE LETTER (OUTPATIENT)
Age: 23
End: 2023-10-22

## 2023-11-26 DIAGNOSIS — F41.9 ANXIETY: ICD-10-CM

## 2023-11-27 RX ORDER — SERTRALINE HYDROCHLORIDE 100 MG/1
TABLET, FILM COATED ORAL
Qty: 90 TABLET | Refills: 1 | Status: SHIPPED | OUTPATIENT
Start: 2023-11-27

## 2024-01-27 ENCOUNTER — MYC REFILL (OUTPATIENT)
Dept: FAMILY MEDICINE | Facility: CLINIC | Age: 24
End: 2024-01-27
Payer: COMMERCIAL

## 2024-01-27 DIAGNOSIS — Z30.41 ENCOUNTER FOR SURVEILLANCE OF CONTRACEPTIVE PILLS: ICD-10-CM

## 2024-01-27 RX ORDER — DROSPIRENONE AND ETHINYL ESTRADIOL 0.02-3(28)
1 KIT ORAL DAILY
Qty: 84 TABLET | Refills: 3 | Status: CANCELLED | OUTPATIENT
Start: 2024-01-27

## 2024-01-29 NOTE — TELEPHONE ENCOUNTER
RX on 5/5/23  #84  R-3 ( 1 year supply).    Last office visit: 5/5/2023     Future Appointments 1/29/2024 - 7/27/2024      None            Requested Prescriptions   Pending Prescriptions Disp Refills    drospirenone-ethinyl estradiol (DORI) 3-0.02 MG tablet 84 tablet 3     Sig: Take 1 tablet by mouth daily       Contraceptives Protocol Passed - 1/27/2024  7:48 PM        Passed - Patient is not a current smoker if age is 35 or older        Passed - Recent (12 mo) or future (30 days) visit within the authorizing provider's specialty     The patient must have completed an in-person or virtual visit within the past 12 months or has a future visit scheduled within the next 90 days with the authorizing provider s specialty.  Urgent care and e-visits do not quality as an office visit for this protocol.          Passed - Medication is active on med list        Passed - No active pregnancy on record        Passed - No positive pregnancy test in past 12 months

## 2024-03-02 DIAGNOSIS — F41.9 ANXIETY: ICD-10-CM
